# Patient Record
Sex: MALE | Race: WHITE | NOT HISPANIC OR LATINO | Employment: OTHER | URBAN - METROPOLITAN AREA
[De-identification: names, ages, dates, MRNs, and addresses within clinical notes are randomized per-mention and may not be internally consistent; named-entity substitution may affect disease eponyms.]

---

## 2017-01-04 ENCOUNTER — OFFICE VISIT (OUTPATIENT)
Dept: CARDIOLOGY | Facility: CLINIC | Age: 77
End: 2017-01-04

## 2017-01-04 VITALS
HEART RATE: 60 BPM | BODY MASS INDEX: 35.1 KG/M2 | WEIGHT: 231.6 LBS | SYSTOLIC BLOOD PRESSURE: 140 MMHG | DIASTOLIC BLOOD PRESSURE: 70 MMHG | HEIGHT: 68 IN

## 2017-01-04 DIAGNOSIS — I20.9 ANGINA PECTORIS (HCC): Primary | ICD-10-CM

## 2017-01-04 DIAGNOSIS — I25.709 CORONARY ARTERY DISEASE INVOLVING CORONARY BYPASS GRAFT OF NATIVE HEART WITH ANGINA PECTORIS (HCC): ICD-10-CM

## 2017-01-04 DIAGNOSIS — E78.5 DYSLIPIDEMIA: ICD-10-CM

## 2017-01-04 DIAGNOSIS — I10 ESSENTIAL HYPERTENSION: ICD-10-CM

## 2017-01-04 PROCEDURE — 93000 ELECTROCARDIOGRAM COMPLETE: CPT | Performed by: INTERNAL MEDICINE

## 2017-01-04 PROCEDURE — 99214 OFFICE O/P EST MOD 30 MIN: CPT | Performed by: INTERNAL MEDICINE

## 2017-01-04 RX ORDER — ISOSORBIDE DINITRATE 30 MG/1
30 TABLET ORAL DAILY
Qty: 90 TABLET | Refills: 3 | Status: SHIPPED | OUTPATIENT
Start: 2017-01-04 | End: 2017-01-25

## 2017-01-04 RX ORDER — RANOLAZINE 500 MG/1
500 TABLET, EXTENDED RELEASE ORAL 2 TIMES DAILY
Qty: 180 TABLET | Refills: 3 | Status: SHIPPED | OUTPATIENT
Start: 2017-01-04 | End: 2017-02-27 | Stop reason: SDUPTHER

## 2017-01-04 NOTE — LETTER
January 4, 2017     Antonio Hatch MD  [address]  [address]  [place] [address]    Patient: Zach Ramsey   YOB: 1940   Date of Visit: 1/4/2017       Dear Dr. Holden MD:    Thank you for referring Zach Ramsey to me for evaluation. Below are the relevant portions of my assessment and plan of care.    If you have questions, please do not hesitate to call me. I look forward to following Zach along with you.         Sincerely,        Lucas Wallis MD        CC: No Recipients  Lucas Wallis MD  1/4/2017  1:59 PM  Signed  Subjective:     Encounter Date:01/04/2017      Patient ID: Zach Ramsey is a 76 y.o. male.    Chief Complaint: Follow up of chest pain shortness of breath and coronary artery disease    History of Present Illness  Patient returns today for annual follow-up of coronary artery disease.  Her last visit, he notes that he is doing slowly and progressively worse over the last 3-6 months.  He notes exertional loss of breath associated with bilateral arm and shoulder pain that occurs with activity and only with activity.  This is much worse after eating.  This is increasing in frequency and severity over the last several months.  Does not occur at rest.  No palpitations orthopnea PND peripheral edema.  He also notes some nocturnal chest pain but only after eating late at night it is different from the above.        1. Coronary artery disease.  a. In 2000, coronary artery bypass grafting.   b. In 2010, cardiac catheterization done at [facility] which showed normal left ventricular function. Three vessel native coronary disease. Patent vein graft to the PDA, patent vein graft to the diagonal and OM branches, and a patent LIMA to the LAD.   c. Echocardiogram, June 2014 which showed mild  to moderate aortic regurgitation. Normal LVEF.   d. Cardiac catheterization, 09/04/2015, revealing patent URIOSTEGUI to LAD, patent saphenous vein graft to the D1, OM1 and OM2, patent  saphenous vein graft to the PDA and native right coronary artery with multiple  stenoses and heavily calcified areas that supply relative little left ventricular myocardium, medical management.     2.  Hypertension.   3. Dyslipidemia.   4. Gastroesophageal reflux disease.   5. Arthritis.   6. Nephrolithiasis.   7. Remote neck surgery.   8. Right total knee replacement.   9. Tonsillectomy.   10. Remote tobacco use with cessation in 1977.         No Known Allergies      Current Outpatient Prescriptions:   •  aspirin 81 MG EC tablet, Take  by mouth daily., Disp: , Rfl:   •  clopidogrel (PLAVIX) 75 MG tablet, Take  by mouth daily., Disp: , Rfl:   •  esomeprazole (NEXIUM) 40 MG capsule, Take  by mouth., Disp: , Rfl:   •  lisinopril-hydrochlorothiazide (PRINZIDE,ZESTORETIC) 20-12.5 MG per tablet, Take  by mouth daily., Disp: , Rfl:   •  nitroglycerin (NITROSTAT) 0.4 MG SL tablet, Place  under the tongue., Disp: , Rfl:   •  pravastatin (PRAVACHOL) 80 MG tablet, Take  by mouth daily., Disp: , Rfl:   •  isosorbide dinitrate (ISORDIL) 30 MG tablet, Take 1 tablet by mouth Daily., Disp: 90 tablet, Rfl: 3  •  ranolazine (RANEXA) 500 MG 12 hr tablet, Take 1 tablet by mouth 2 (Two) Times a Day., Disp: 180 tablet, Rfl: 3    The following portions of the patient's history were reviewed and updated as appropriate: allergies, current medications, past family history, past medical history, past social history, past surgical history and problem list.    Review of Systems   Constitution: Negative.   Cardiovascular: Positive for chest pain and dyspnea on exertion.   Respiratory: Negative.    Hematologic/Lymphatic: Negative for bleeding problem. Does not bruise/bleed easily.   Skin: Negative for rash.   Musculoskeletal: Positive for arthritis. Negative for muscle weakness and myalgias.   Gastrointestinal: Positive for heartburn. Negative for nausea and vomiting.   Neurological: Negative.           Objective:   Blood pressure 140/70, pulse  "60, height 68\" (172.7 cm), weight 231 lb 9.6 oz (105 kg).      Physical Exam   Constitutional: He is oriented to person, place, and time. He appears well-developed and well-nourished.   HENT:   Head: Normocephalic.   Mouth/Throat: Oropharynx is clear and moist.   Eyes: Pupils are equal, round, and reactive to light.   Neck: Normal carotid pulses and no JVD present. Carotid bruit is not present.   Cardiovascular: Normal rate, regular rhythm, S1 normal and S2 normal.  Exam reveals no gallop and no friction rub.    Murmur (3/6 systolic ejection murmur heard best at the apex and right upper sternal border) heard.  Pulses:       Carotid pulses are 2+ on the right side, and 2+ on the left side.       Dorsalis pedis pulses are 2+ on the right side, and 2+ on the left side.        Posterior tibial pulses are 2+ on the right side, and 2+ on the left side.   Pulmonary/Chest: No respiratory distress. He has no wheezes. He has no rales.   Abdominal: He exhibits no distension and no abdominal bruit. There is no hepatosplenomegaly. There is no tenderness. There is no rebound.   Musculoskeletal: He exhibits no edema, tenderness or deformity.   Neurological: He is alert and oriented to person, place, and time. He has normal strength.   Skin: Skin is warm and dry. No rash noted. No cyanosis. Nails show no clubbing.   Psychiatric: He has a normal mood and affect. Cognition and memory are normal.       Lab Review:      ECG 12 Lead  Date/Time: 1/4/2017 1:55 PM  Performed by: OSCAR SIMON  Authorized by: OSCAR SIMON   Rhythm: sinus rhythm  Comments: Inferior T wave abnormality                Assessment:   Zach was seen today for hypertension, shortness of breath, fatigue, coronary artery disease and hyperlipidemia.    Diagnoses and all orders for this visit:    Angina pectoris    Coronary artery disease involving coronary bypass graft of native heart with angina pectoris    Dyslipidemia    Essential hypertension    Other " orders  -     isosorbide dinitrate (ISORDIL) 30 MG tablet; Take 1 tablet by mouth Daily.  -     ranolazine (RANEXA) 500 MG 12 hr tablet; Take 1 tablet by mouth 2 (Two) Times a Day.  -     ECG 12 Lead      Impression  1. Functional class III angina.  Slowly progressing over the last 3-6 months.  Known severe native RCA disease, supplying a small amount of myocardium in the right PDA by catheter 2 years ago.  This is felt to be high risk intervention.  Otherwise grafts are widely patent at that time.  2. Hypertension controlled  3. Dyslipidemia controlled on current statin dose  4. GERD, nocturnal.  Despite PPI.  5. Sick sinus syndrome/chronotropic incompetence.    Plan:  1. Long discussion regarding options with the patient and spouse.  Ms. somewhat reluctant to undergo repeat heart catheterization.  It is certainly possible that we are simply dealing with the known prior area of ischemia that is progressing.  He has been intolerant to beta blockers in the past.  Since this is not an option.  2. Continue current medications.  Add isosorbide 30 mg daily, and Ranexa 500 mg twice a day  3. Revisit in 3 weeks to assess for efficacy of the above medical therapy.  If he fails the above medical therapy, wore his symptoms worsen in the meantime, the patient will need ischemic evaluation, likely left heart catheterization.     Lucas Wallis MD

## 2017-01-04 NOTE — PROGRESS NOTES
Subjective:     Encounter Date:01/04/2017      Patient ID: Zach Ramsey is a 76 y.o. male.    Chief Complaint: Follow up of chest pain shortness of breath and coronary artery disease    History of Present Illness  Patient returns today for annual follow-up of coronary artery disease.  Her last visit, he notes that he is doing slowly and progressively worse over the last 3-6 months.  He notes exertional loss of breath associated with bilateral arm and shoulder pain that occurs with activity and only with activity.  This is much worse after eating.  This is increasing in frequency and severity over the last several months.  Does not occur at rest.  No palpitations orthopnea PND peripheral edema.  He also notes some nocturnal chest pain but only after eating late at night it is different from the above.        1. Coronary artery disease.  a. In 2000, coronary artery bypass grafting.   b. In 2010, cardiac catheterization done at [facility] which showed normal left ventricular function. Three vessel native coronary disease. Patent vein graft to the PDA, patent vein graft to the diagonal and OM branches, and a patent LIMA to the LAD.   c. Echocardiogram, June 2014 which showed mild  to moderate aortic regurgitation. Normal LVEF.   d. Cardiac catheterization, 09/04/2015, revealing patent URIOSTEGUI to LAD, patent saphenous vein graft to the D1, OM1 and OM2, patent saphenous vein graft to the PDA and native right coronary artery with multiple  stenoses and heavily calcified areas that supply relative little left ventricular myocardium, medical management.     2.  Hypertension.   3. Dyslipidemia.   4. Gastroesophageal reflux disease.   5. Arthritis.   6. Nephrolithiasis.   7. Remote neck surgery.   8. Right total knee replacement.   9. Tonsillectomy.   10. Remote tobacco use with cessation in 1977.         No Known Allergies      Current Outpatient Prescriptions:   •  aspirin 81 MG EC tablet, Take  by mouth  "daily., Disp: , Rfl:   •  clopidogrel (PLAVIX) 75 MG tablet, Take  by mouth daily., Disp: , Rfl:   •  esomeprazole (NEXIUM) 40 MG capsule, Take  by mouth., Disp: , Rfl:   •  lisinopril-hydrochlorothiazide (PRINZIDE,ZESTORETIC) 20-12.5 MG per tablet, Take  by mouth daily., Disp: , Rfl:   •  nitroglycerin (NITROSTAT) 0.4 MG SL tablet, Place  under the tongue., Disp: , Rfl:   •  pravastatin (PRAVACHOL) 80 MG tablet, Take  by mouth daily., Disp: , Rfl:   •  isosorbide dinitrate (ISORDIL) 30 MG tablet, Take 1 tablet by mouth Daily., Disp: 90 tablet, Rfl: 3  •  ranolazine (RANEXA) 500 MG 12 hr tablet, Take 1 tablet by mouth 2 (Two) Times a Day., Disp: 180 tablet, Rfl: 3    The following portions of the patient's history were reviewed and updated as appropriate: allergies, current medications, past family history, past medical history, past social history, past surgical history and problem list.    Review of Systems   Constitution: Negative.   Cardiovascular: Positive for chest pain and dyspnea on exertion.   Respiratory: Negative.    Hematologic/Lymphatic: Negative for bleeding problem. Does not bruise/bleed easily.   Skin: Negative for rash.   Musculoskeletal: Positive for arthritis. Negative for muscle weakness and myalgias.   Gastrointestinal: Positive for heartburn. Negative for nausea and vomiting.   Neurological: Negative.           Objective:   Blood pressure 140/70, pulse 60, height 68\" (172.7 cm), weight 231 lb 9.6 oz (105 kg).      Physical Exam   Constitutional: He is oriented to person, place, and time. He appears well-developed and well-nourished.   HENT:   Head: Normocephalic.   Mouth/Throat: Oropharynx is clear and moist.   Eyes: Pupils are equal, round, and reactive to light.   Neck: Normal carotid pulses and no JVD present. Carotid bruit is not present.   Cardiovascular: Normal rate, regular rhythm, S1 normal and S2 normal.  Exam reveals no gallop and no friction rub.    Murmur (3/6 systolic ejection " murmur heard best at the apex and right upper sternal border) heard.  Pulses:       Carotid pulses are 2+ on the right side, and 2+ on the left side.       Dorsalis pedis pulses are 2+ on the right side, and 2+ on the left side.        Posterior tibial pulses are 2+ on the right side, and 2+ on the left side.   Pulmonary/Chest: No respiratory distress. He has no wheezes. He has no rales.   Abdominal: He exhibits no distension and no abdominal bruit. There is no hepatosplenomegaly. There is no tenderness. There is no rebound.   Musculoskeletal: He exhibits no edema, tenderness or deformity.   Neurological: He is alert and oriented to person, place, and time. He has normal strength.   Skin: Skin is warm and dry. No rash noted. No cyanosis. Nails show no clubbing.   Psychiatric: He has a normal mood and affect. Cognition and memory are normal.       Lab Review:      ECG 12 Lead  Date/Time: 1/4/2017 1:55 PM  Performed by: OSCAR SIMON  Authorized by: OSCAR SIMON   Rhythm: sinus rhythm  Comments: Inferior T wave abnormality                Assessment:   Zach was seen today for hypertension, shortness of breath, fatigue, coronary artery disease and hyperlipidemia.    Diagnoses and all orders for this visit:    Angina pectoris    Coronary artery disease involving coronary bypass graft of native heart with angina pectoris    Dyslipidemia    Essential hypertension    Other orders  -     isosorbide dinitrate (ISORDIL) 30 MG tablet; Take 1 tablet by mouth Daily.  -     ranolazine (RANEXA) 500 MG 12 hr tablet; Take 1 tablet by mouth 2 (Two) Times a Day.  -     ECG 12 Lead      Impression  1. Functional class III angina.  Slowly progressing over the last 3-6 months.  Known severe native RCA disease, supplying a small amount of myocardium in the right PDA by catheter 2 years ago.  This is felt to be high risk intervention.  Otherwise grafts are widely patent at that time.  2. Hypertension controlled  3. Dyslipidemia  controlled on current statin dose  4. GERD, nocturnal.  Despite PPI.  5. Sick sinus syndrome/chronotropic incompetence.    Plan:  1. Long discussion regarding options with the patient and spouse.  Ms. somewhat reluctant to undergo repeat heart catheterization.  It is certainly possible that we are simply dealing with the known prior area of ischemia that is progressing.  He has been intolerant to beta blockers in the past.  Since this is not an option.  2. Continue current medications.  Add isosorbide 30 mg daily, and Ranexa 500 mg twice a day  3. Revisit in 3 weeks to assess for efficacy of the above medical therapy.  If he fails the above medical therapy, wore his symptoms worsen in the meantime, the patient will need ischemic evaluation, likely left heart catheterization.     Lucas Wallis MD

## 2017-01-06 ENCOUNTER — OFFICE VISIT (OUTPATIENT)
Dept: FAMILY MEDICINE CLINIC | Facility: CLINIC | Age: 77
End: 2017-01-06

## 2017-01-06 VITALS
BODY MASS INDEX: 34.86 KG/M2 | TEMPERATURE: 97.6 F | RESPIRATION RATE: 18 BRPM | DIASTOLIC BLOOD PRESSURE: 64 MMHG | HEIGHT: 68 IN | OXYGEN SATURATION: 98 % | WEIGHT: 230 LBS | SYSTOLIC BLOOD PRESSURE: 132 MMHG | HEART RATE: 75 BPM

## 2017-01-06 DIAGNOSIS — J06.9 PROTRACTED URI: Primary | ICD-10-CM

## 2017-01-06 PROCEDURE — 99213 OFFICE O/P EST LOW 20 MIN: CPT | Performed by: FAMILY MEDICINE

## 2017-01-06 RX ORDER — AZITHROMYCIN 250 MG/1
TABLET, FILM COATED ORAL
Qty: 6 TABLET | Refills: 0 | Status: SHIPPED | OUTPATIENT
Start: 2017-01-06 | End: 2017-01-25

## 2017-01-06 NOTE — MR AVS SNAPSHOT
Zach Ramsey   1/6/2017 2:30 PM   Office Visit    Dept Phone:  [phone]   Encounter #:  [id number]    Provider:  Antonio Hatch MD   Department:  [facility]                Your Full Care Plan              Today's Medication Changes          These changes are accurate as of: 1/6/17  2:51 PM.  If you have any questions, ask your nurse or doctor.               New Medication(s)Ordered:     azithromycin 250 MG tablet   Commonly known as:  ZITHROMAX   Take 2 tablets the first day, then 1 tablet daily for 4 days.   Started by:  Antonio Hatch MD       HYDROcodone-homatropine 5-1.5 MG/5ML syrup   Commonly known as:  HYCODAN   Take 5 mL by mouth Every 6 (Six) Hours As Needed for cough.   Started by:  Antonio Hatch MD            Where to Get Your Medications      You can get these medications from any pharmacy     Bring a paper prescription for each of these medications     azithromycin 250 MG tablet    HYDROcodone-homatropine 5-1.5 MG/5ML syrup                  Your Updated Medication List          This list is accurate as of: 1/6/17  2:51 PM.  Always use your most recent med list.                aspirin 81 MG EC tablet       azithromycin 250 MG tablet   Commonly known as:  ZITHROMAX   Take 2 tablets the first day, then 1 tablet daily for 4 days.       clopidogrel 75 MG tablet   Commonly known as:  PLAVIX       HYDROcodone-homatropine 5-1.5 MG/5ML syrup   Commonly known as:  HYCODAN   Take 5 mL by mouth Every 6 (Six) Hours As Needed for cough.       isosorbide dinitrate 30 MG tablet   Commonly known as:  ISORDIL   Take 1 tablet by mouth Daily.       lisinopril-hydrochlorothiazide 20-12.5 MG per tablet   Commonly known as:  PRINZIDE,ZESTORETIC       NEXIUM 40 MG capsule   Generic drug:  esomeprazole       nitroglycerin 0.4 MG SL tablet   Commonly known as:  NITROSTAT       pravastatin 80 MG tablet   Commonly known as:  PRAVACHOL       ranolazine 500 MG 12  "hr tablet   Commonly known as:  RANEXA   Take 1 tablet by mouth 2 (Two) Times a Day.               You Were Diagnosed With        Codes Comments    Protracted URI    -  Primary ICD-10-CM: J06.9  ICD-9-CM: 465.9       Instructions     None    Patient Instructions History      Upcoming Appointments     Visit Type Date Time Department    OFFICE VISIT 1/6/2017  2:30 PM [facility]      [facility] Signup     Our records indicate that you have declined [facility] signup. If you would like to sign up for [facility] please email [email or web address] or call [phone] to obtain an activation code.             Other Info from Your Visit           Allergies     No Known Allergies      Reason for Visit     URI achy muscles, nasal congestion white in color, has been going on for 2 wks    Sore Throat     Cough           Vital Signs     Blood Pressure Pulse Temperature Respirations Height Weight    132/64 75 97.6 °F (36.4 °C) (Temporal Artery ) 18 68\" (172.7 cm) 230 lb (104 kg)    Oxygen Saturation Body Mass Index Smoking Status             98% 34.97 kg/m2 Former Smoker         Problems and Diagnoses Noted     Acute upper respiratory infection    -  Primary        "

## 2017-01-06 NOTE — PROGRESS NOTES
Chief Complaint   Patient presents with   • URI     achy muscles, nasal congestion white in color, has been going on for 2 wks   • Sore Throat   • Cough       Subjective     URI    This is a new problem. The current episode started 1 to 4 weeks ago (x 2 weeks ). The problem has been gradually worsening. There has been no fever. Associated symptoms include congestion (white), coughing (decreased sleep), headaches, rhinorrhea, sneezing, a sore throat and wheezing (decreased sleep). Pertinent negatives include no nausea or vomiting. Associated symptoms comments: aches all over. He has tried NSAIDs for the symptoms. The treatment provided mild relief.   Sore Throat    Associated symptoms include congestion (white), coughing (decreased sleep) and headaches. Pertinent negatives include no vomiting.   Cough   Associated symptoms include headaches, myalgias, rhinorrhea, a sore throat and wheezing (decreased sleep). Pertinent negatives include no fever.     Past Medical History,Medications, Allergies, and social history was reviewed.    Review of Systems   Constitutional: Positive for fatigue. Negative for fever.   HENT: Positive for congestion (white), rhinorrhea, sneezing and sore throat.    Respiratory: Positive for cough (decreased sleep) and wheezing (decreased sleep).    Gastrointestinal: Negative.  Negative for nausea and vomiting.   Musculoskeletal: Positive for myalgias.   Neurological: Positive for headaches.   Psychiatric/Behavioral: Positive for sleep disturbance.       Objective     Physical Exam   Constitutional: He is oriented to person, place, and time. Vital signs are normal. He appears well-developed and well-nourished.   HENT:   Head: Normocephalic and atraumatic.   Right Ear: Hearing, external ear and ear canal normal. Tympanic membrane is retracted. Tympanic membrane is not erythematous.   Left Ear: Hearing, external ear and ear canal normal. Tympanic membrane is retracted. Tympanic membrane is not  erythematous.   Nose: Nose normal.   Mouth/Throat: Uvula is midline. Posterior oropharyngeal erythema present. No oropharyngeal exudate.   Eyes: Conjunctivae, EOM and lids are normal. Pupils are equal, round, and reactive to light.   Neck: Normal range of motion. Neck supple. No thyromegaly present.   Cardiovascular: Normal rate, regular rhythm and normal heart sounds.  Exam reveals no gallop and no friction rub.    No murmur heard.  Pulmonary/Chest: Effort normal. No respiratory distress. He has no decreased breath sounds. He has wheezes (faint exp with cough). He has no rhonchi. He has no rales.   Abdominal: Normal appearance.   Neurological: He is alert and oriented to person, place, and time. He has normal strength.   Skin: Skin is warm and dry.   Psychiatric: He has a normal mood and affect. His speech is normal and behavior is normal. Cognition and memory are normal.   Nursing note and vitals reviewed.        Assessment/Plan     Problem List Items Addressed This Visit     None      Visit Diagnoses     Protracted URI    -  Primary    Relevant Medications    azithromycin (ZITHROMAX) 250 MG tablet    HYDROcodone-homatropine (HYCODAN) 5-1.5 MG/5ML syrup          Hao dated on 01/06/17   was reviewed and appropriate.      DISCUSSION  Start Z edith, increase fluids, Hycodan as needed. Side effects explained.   Call if not improving.      Return if symptoms worsen or fail to improve.     MEDICATIONS PRESCRIBED  Requested Prescriptions     Signed Prescriptions Disp Refills   • azithromycin (ZITHROMAX) 250 MG tablet 6 tablet 0     Sig: Take 2 tablets the first day, then 1 tablet daily for 4 days.   • HYDROcodone-homatropine (HYCODAN) 5-1.5 MG/5ML syrup 240 mL 0     Sig: Take 5 mL by mouth Every 6 (Six) Hours As Needed for cough.          Antonio Hatch MD

## 2017-01-10 ENCOUNTER — TELEPHONE (OUTPATIENT)
Dept: CARDIOLOGY | Facility: CLINIC | Age: 77
End: 2017-01-10

## 2017-01-10 NOTE — TELEPHONE ENCOUNTER
Pt called and stated he would not be taking the Isosorbide due to dizziness and low BP after taking. Pt is going to continue to take the Ranexa 500mg BID. He states he will call back if there are any other questions or concerns. Just an FYI.

## 2017-01-25 ENCOUNTER — OFFICE VISIT (OUTPATIENT)
Dept: CARDIOLOGY | Facility: CLINIC | Age: 77
End: 2017-01-25

## 2017-01-25 VITALS
BODY MASS INDEX: 34.75 KG/M2 | HEIGHT: 68 IN | SYSTOLIC BLOOD PRESSURE: 132 MMHG | HEART RATE: 72 BPM | WEIGHT: 229.3 LBS | DIASTOLIC BLOOD PRESSURE: 60 MMHG

## 2017-01-25 DIAGNOSIS — R07.2 PRECORDIAL PAIN: ICD-10-CM

## 2017-01-25 DIAGNOSIS — I25.709 CORONARY ARTERY DISEASE INVOLVING CORONARY BYPASS GRAFT OF NATIVE HEART WITH ANGINA PECTORIS (HCC): ICD-10-CM

## 2017-01-25 DIAGNOSIS — I35.1 AORTIC VALVE INSUFFICIENCY, UNSPECIFIED ETIOLOGY: Primary | ICD-10-CM

## 2017-01-25 DIAGNOSIS — E78.5 DYSLIPIDEMIA: ICD-10-CM

## 2017-01-25 DIAGNOSIS — I10 ESSENTIAL HYPERTENSION: ICD-10-CM

## 2017-01-25 DIAGNOSIS — K21.00 GASTROESOPHAGEAL REFLUX DISEASE WITH ESOPHAGITIS: ICD-10-CM

## 2017-01-25 PROCEDURE — 99214 OFFICE O/P EST MOD 30 MIN: CPT | Performed by: INTERNAL MEDICINE

## 2017-01-25 NOTE — PROGRESS NOTES
"Subjective:     Encounter Date:01/25/2017      Patient ID: Zach Ramsey is a 76 y.o. male.    Chief Complaint: Follow up of chest pain and coronary artery disease    1. Coronary artery disease.  a. In 2000, coronary artery bypass grafting.   b. In 2010, cardiac catheterization done at [facility] which showed normal left ventricular function. Three vessel native coronary disease. Patent vein graft to the PDA, patent vein graft to the diagonal and OM branches, and a patent LIMA to the LAD.   c. Echocardiogram, June 2014 which showed mild  to moderate aortic regurgitation. Normal LVEF.   d. Cardiac catheterization, 09/04/2015, revealing patent URIOSTEGUI to LAD, patent saphenous vein graft to the D1, OM1 and OM2, patent saphenous vein graft to the PDA and native right coronary artery with multiple  stenoses and heavily calcified areas that supply relative little left ventricular myocardium, medical management.     2.  Hypertension.   3. Dyslipidemia.   4. Gastroesophageal reflux disease.   5. Arthritis.   6. Nephrolithiasis.   7. Remote neck surgery.   8. Right total knee replacement.   9. Tonsillectomy.   10. Remote tobacco use with cessation in 1977.         History of Present Illness  Patient returns today for follow up with a history of chest pain and coronary artery disease.  Her last visit patient was put on Ranexa and isosorbide.  He has a d discontinue the isosorbide due to symptomatic hypotension.  The Ranexa he continued.  Overall feels less chest pain with exertion.  He and his wife also note he has a significant amount of dietary changes so he no longer eats as much nor is rapidly with meals.  He notes that this appears to have had the bigger improvement in his symptoms than medical therapy.  He also notes some dysphagia since he has a \"pain attention to how he swallows\".  Otherwise we will be regurgitated later.  He has predominantly discomfort in his chest and arm when he exercises after " "eating.  He has much less chest discomfort if he exercises before eating.  He has chest discomfort simply after eating.  (Without exercise).     Allergies   Allergen Reactions   • Isosorbide Dizziness   • Metoprolol Nausea Only         Current Outpatient Prescriptions:   •  aspirin 81 MG EC tablet, Take  by mouth daily., Disp: , Rfl:   •  clopidogrel (PLAVIX) 75 MG tablet, Take  by mouth daily., Disp: , Rfl:   •  esomeprazole (NEXIUM) 40 MG capsule, Take  by mouth., Disp: , Rfl:   •  lisinopril-hydrochlorothiazide (PRINZIDE,ZESTORETIC) 20-12.5 MG per tablet, Take  by mouth daily., Disp: , Rfl:   •  nitroglycerin (NITROSTAT) 0.4 MG SL tablet, Place  under the tongue., Disp: , Rfl:   •  pravastatin (PRAVACHOL) 80 MG tablet, Take  by mouth daily., Disp: , Rfl:   •  ranolazine (RANEXA) 500 MG 12 hr tablet, Take 1 tablet by mouth 2 (Two) Times a Day., Disp: 180 tablet, Rfl: 3    The following portions of the patient's history were reviewed and updated as appropriate: allergies, current medications, past family history, past medical history, past social history, past surgical history and problem list.    Review of Systems   Constitution: Negative.   Cardiovascular: Positive for chest pain.   Respiratory: Negative.    Hematologic/Lymphatic: Negative for bleeding problem. Does not bruise/bleed easily.   Skin: Negative for rash.   Musculoskeletal: Negative for muscle weakness and myalgias.   Gastrointestinal: Positive for dysphagia. Negative for heartburn, nausea and vomiting.   Neurological: Negative.           Objective:   Blood pressure 132/60, pulse 72, height 68\" (172.7 cm), weight 229 lb 4.8 oz (104 kg).      Physical Exam   Constitutional: He is oriented to person, place, and time. He appears well-developed and well-nourished.   HENT:   Mouth/Throat: Oropharynx is clear and moist.   Neck: No JVD present. Carotid bruit is not present. No thyromegaly present.   Cardiovascular: Regular rhythm, S1 normal, S2 normal and " intact distal pulses.  Exam reveals no gallop, no S3 and no S4.    Murmur heard.   Midsystolic murmur is present with a grade of 3/6  at the upper left sternal border, apex  Pulses:       Carotid pulses are 2+ on the right side, and 2+ on the left side.       Radial pulses are 2+ on the right side, and 2+ on the left side.   Pulmonary/Chest: Breath sounds normal.   Abdominal: Soft. Bowel sounds are normal. He exhibits no mass. There is no tenderness.   Musculoskeletal: He exhibits no edema.   Neurological: He is alert and oriented to person, place, and time.   Skin: Skin is warm and dry. No rash noted.       Lab Review:    Procedures        Assessment:   Zahc was seen today for follow-up, coronary artery disease and hypertension.    Diagnoses and all orders for this visit:    Aortic valve insufficiency, unspecified etiology    Coronary artery disease involving coronary bypass graft of native heart with angina pectoris    Dyslipidemia    Essential hypertension    Precordial pain    Gastroesophageal reflux disease with esophagitis      Impression  1. Coronary artery disease status post 50 months ago with chronic disease RCA.  2. Mild to moderate aortic insufficiency asymptomatic  3. Dyslipidemia  4. Hypertension controlled  5. Chest pain, some features of angina and also some of esophageal disease.  6. No GERD and esophagitis, has dysphagia for stricture or other GI motility disorder.    Plan:  1. Discussed options with patient.  His symptoms have improved significantly, primarily due to dietary changes.  At this time I agree with seeing Dr. Paula for possible EGD as soon as possible.  2. Meanwhile we'll continue current medical therapy of CAD/angina.  He understands that this exertional component worsens to contact us for possible Repeat catheter.  Otherwise we'll simply continue medical therapy.  3. Revisit in 6 MO, or sooner as needed.      I, Lucas Wallis MD, personally performed the services described in this  documentation as scribed by the above individual in my presence, and it is both accurate and complete

## 2017-01-25 NOTE — LETTER
January 25, 2017     Antonio Hatch MD  [address]  [facility] [place] [address]    Patient: Zach Ramsey   YOB: 1940   Date of Visit: 1/25/2017       Dear Dr. Holedn MD:    Thank you for referring Zach Ramsey to me for evaluation. Below are the relevant portions of my assessment and plan of care.    If you have questions, please do not hesitate to call me. I look forward to following Zach along with you.         Sincerely,        Lucas Wallis MD        CC: No Recipients  Lucas Wallis MD  1/25/2017  2:00 PM  Signed  Subjective:     Encounter Date:01/25/2017      Patient ID: Zach Ramsey is a 76 y.o. male.    Chief Complaint: Follow up of chest pain and coronary artery disease    1. Coronary artery disease.  a. In 2000, coronary artery bypass grafting.   b. In 2010, cardiac catheterization done at [facility] which showed normal left ventricular function. Three vessel native coronary disease. Patent vein graft to the PDA, patent vein graft to the diagonal and OM branches, and a patent LIMA to the LAD.   c. Echocardiogram, June 2014 which showed mild  to moderate aortic regurgitation. Normal LVEF.   d. Cardiac catheterization, 09/04/2015, revealing patent URIOSTEGUI to LAD, patent saphenous vein graft to the D1, OM1 and OM2, patent saphenous vein graft to the PDA and native right coronary artery with multiple  stenoses and heavily calcified areas that supply relative little left ventricular myocardium, medical management.     2.  Hypertension.   3. Dyslipidemia.   4. Gastroesophageal reflux disease.   5. Arthritis.   6. Nephrolithiasis.   7. Remote neck surgery.   8. Right total knee replacement.   9. Tonsillectomy.   10. Remote tobacco use with cessation in 1977.         History of Present Illness  Patient returns today for follow up with a history of chest pain and coronary artery disease.  Her last visit patient was put on Ranexa and isosorbide.  He has a d discontinue  "the isosorbide due to symptomatic hypotension.  The Ranexa he continued.  Overall feels less chest pain with exertion.  He and his wife also note he has a significant amount of dietary changes so he no longer eats as much nor is rapidly with meals.  He notes that this appears to have had the bigger improvement in his symptoms than medical therapy.  He also notes some dysphagia since he has a \"pain attention to how he swallows\".  Otherwise we will be regurgitated later.  He has predominantly discomfort in his chest and arm when he exercises after eating.  He has much less chest discomfort if he exercises before eating.  He has chest discomfort simply after eating.  (Without exercise).     Allergies   Allergen Reactions   • Isosorbide Dizziness   • Metoprolol Nausea Only         Current Outpatient Prescriptions:   •  aspirin 81 MG EC tablet, Take  by mouth daily., Disp: , Rfl:   •  clopidogrel (PLAVIX) 75 MG tablet, Take  by mouth daily., Disp: , Rfl:   •  esomeprazole (NEXIUM) 40 MG capsule, Take  by mouth., Disp: , Rfl:   •  lisinopril-hydrochlorothiazide (PRINZIDE,ZESTORETIC) 20-12.5 MG per tablet, Take  by mouth daily., Disp: , Rfl:   •  nitroglycerin (NITROSTAT) 0.4 MG SL tablet, Place  under the tongue., Disp: , Rfl:   •  pravastatin (PRAVACHOL) 80 MG tablet, Take  by mouth daily., Disp: , Rfl:   •  ranolazine (RANEXA) 500 MG 12 hr tablet, Take 1 tablet by mouth 2 (Two) Times a Day., Disp: 180 tablet, Rfl: 3    The following portions of the patient's history were reviewed and updated as appropriate: allergies, current medications, past family history, past medical history, past social history, past surgical history and problem list.    Review of Systems   Constitution: Negative.   Cardiovascular: Positive for chest pain.   Respiratory: Negative.    Hematologic/Lymphatic: Negative for bleeding problem. Does not bruise/bleed easily.   Skin: Negative for rash.   Musculoskeletal: Negative for muscle weakness and " "myalgias.   Gastrointestinal: Positive for dysphagia. Negative for heartburn, nausea and vomiting.   Neurological: Negative.           Objective:   Blood pressure 132/60, pulse 72, height 68\" (172.7 cm), weight 229 lb 4.8 oz (104 kg).      Physical Exam   Constitutional: He is oriented to person, place, and time. He appears well-developed and well-nourished.   HENT:   Mouth/Throat: Oropharynx is clear and moist.   Neck: No JVD present. Carotid bruit is not present. No thyromegaly present.   Cardiovascular: Regular rhythm, S1 normal, S2 normal and intact distal pulses.  Exam reveals no gallop, no S3 and no S4.    Murmur heard.   Midsystolic murmur is present with a grade of 3/6  at the upper left sternal border, apex  Pulses:       Carotid pulses are 2+ on the right side, and 2+ on the left side.       Radial pulses are 2+ on the right side, and 2+ on the left side.   Pulmonary/Chest: Breath sounds normal.   Abdominal: Soft. Bowel sounds are normal. He exhibits no mass. There is no tenderness.   Musculoskeletal: He exhibits no edema.   Neurological: He is alert and oriented to person, place, and time.   Skin: Skin is warm and dry. No rash noted.       Lab Review:    Procedures        Assessment:   Zach was seen today for follow-up, coronary artery disease and hypertension.    Diagnoses and all orders for this visit:    Aortic valve insufficiency, unspecified etiology    Coronary artery disease involving coronary bypass graft of native heart with angina pectoris    Dyslipidemia    Essential hypertension    Precordial pain    Gastroesophageal reflux disease with esophagitis      Impression  1. Coronary artery disease status post 50 months ago with chronic disease RCA.  2. Mild to moderate aortic insufficiency asymptomatic  3. Dyslipidemia  4. Hypertension controlled  5. Chest pain, some features of angina and also some of esophageal disease.  6. No GERD and esophagitis, has dysphagia for stricture or other GI " motility disorder.    Plan:  1. Discussed options with patient.  His symptoms have improved significantly, primarily due to dietary changes.  At this time I agree with seeing Dr. Paula for possible EGD as soon as possible.  2. Meanwhile we'll continue current medical therapy of CAD/angina.  He understands that this exertional component worsens to contact us for possible Repeat catheter.  Otherwise we'll simply continue medical therapy.  3. Revisit in 6 MO, or sooner as needed.      I, Lucas Wallis MD, personally performed the services described in this documentation as scribed by the above individual in my presence, and it is both accurate and complete

## 2017-02-22 RX ORDER — LISINOPRIL AND HYDROCHLOROTHIAZIDE 20; 12.5 MG/1; MG/1
TABLET ORAL
Qty: 90 TABLET | Refills: 3 | Status: SHIPPED | OUTPATIENT
Start: 2017-02-22 | End: 2018-03-05 | Stop reason: SDUPTHER

## 2017-02-22 RX ORDER — CLOPIDOGREL BISULFATE 75 MG/1
TABLET ORAL
Qty: 90 TABLET | Refills: 3 | Status: SHIPPED | OUTPATIENT
Start: 2017-02-22 | End: 2018-03-05 | Stop reason: SDUPTHER

## 2017-02-27 RX ORDER — RANOLAZINE 500 MG/1
500 TABLET, EXTENDED RELEASE ORAL 2 TIMES DAILY
Qty: 180 TABLET | Refills: 3 | Status: SHIPPED | OUTPATIENT
Start: 2017-02-27 | End: 2018-02-06

## 2017-03-08 PROBLEM — J45.909 ASTHMA: Status: ACTIVE | Noted: 2017-03-08

## 2017-03-08 PROBLEM — R73.9 HYPERGLYCEMIA: Status: ACTIVE | Noted: 2017-03-08

## 2017-03-08 PROBLEM — I73.9 PERIPHERAL ARTERIAL DISEASE (HCC): Status: ACTIVE | Noted: 2017-03-08

## 2017-03-09 ENCOUNTER — OFFICE VISIT (OUTPATIENT)
Dept: FAMILY MEDICINE CLINIC | Facility: CLINIC | Age: 77
End: 2017-03-09

## 2017-03-09 VITALS
BODY MASS INDEX: 35.01 KG/M2 | HEIGHT: 68 IN | WEIGHT: 231 LBS | HEART RATE: 73 BPM | RESPIRATION RATE: 18 BRPM | SYSTOLIC BLOOD PRESSURE: 124 MMHG | OXYGEN SATURATION: 99 % | TEMPERATURE: 97.8 F | DIASTOLIC BLOOD PRESSURE: 58 MMHG

## 2017-03-09 DIAGNOSIS — M79.602 LEFT ARM PAIN: ICD-10-CM

## 2017-03-09 DIAGNOSIS — R06.02 SHORTNESS OF BREATH: ICD-10-CM

## 2017-03-09 DIAGNOSIS — E78.2 MIXED HYPERLIPIDEMIA: ICD-10-CM

## 2017-03-09 DIAGNOSIS — R20.0 LEFT ARM NUMBNESS: ICD-10-CM

## 2017-03-09 DIAGNOSIS — Z23 IMMUNIZATION DUE: ICD-10-CM

## 2017-03-09 DIAGNOSIS — R05.9 COUGH: ICD-10-CM

## 2017-03-09 DIAGNOSIS — R07.9 CHEST PAIN, UNSPECIFIED TYPE: Primary | ICD-10-CM

## 2017-03-09 LAB
ALBUMIN SERPL-MCNC: 4.2 G/DL (ref 3.2–4.8)
ALBUMIN/GLOB SERPL: 1.8 G/DL (ref 1.5–2.5)
ALP SERPL-CCNC: 66 U/L (ref 25–100)
ALT SERPL-CCNC: 17 U/L (ref 7–40)
AST SERPL-CCNC: 17 U/L (ref 0–33)
BASOPHILS # BLD AUTO: 0.03 10*3/MM3 (ref 0–0.2)
BASOPHILS NFR BLD AUTO: 0.5 % (ref 0–1)
BILIRUB SERPL-MCNC: 0.4 MG/DL (ref 0.3–1.2)
BUN SERPL-MCNC: 19 MG/DL (ref 9–23)
BUN/CREAT SERPL: 23.8 (ref 7–25)
CALCIUM SERPL-MCNC: 9.5 MG/DL (ref 8.7–10.4)
CHLORIDE SERPL-SCNC: 106 MMOL/L (ref 99–109)
CHOLEST SERPL-MCNC: 138 MG/DL (ref 0–200)
CHOLEST/HDLC SERPL: 2.09 {RATIO}
CO2 SERPL-SCNC: 32 MMOL/L (ref 20–31)
CREAT SERPL-MCNC: 0.8 MG/DL (ref 0.6–1.3)
EOSINOPHIL # BLD AUTO: 0.1 10*3/MM3 (ref 0.1–0.3)
EOSINOPHIL NFR BLD AUTO: 1.8 % (ref 0–3)
ERYTHROCYTE [DISTWIDTH] IN BLOOD BY AUTOMATED COUNT: 13.5 % (ref 11.3–14.5)
GLOBULIN SER CALC-MCNC: 2.4 GM/DL
GLUCOSE SERPL-MCNC: 99 MG/DL (ref 70–100)
HCT VFR BLD AUTO: 35.5 % (ref 38.9–50.9)
HDLC SERPL-MCNC: 66 MG/DL (ref 40–60)
HGB BLD-MCNC: 12.1 G/DL (ref 13.1–17.5)
IMM GRANULOCYTES # BLD: 0.02 10*3/MM3 (ref 0–0.03)
IMM GRANULOCYTES NFR BLD: 0.4 % (ref 0–0.6)
LDLC SERPL CALC-MCNC: 59 MG/DL (ref 0–100)
LYMPHOCYTES # BLD AUTO: 1.12 10*3/MM3 (ref 0.6–4.8)
LYMPHOCYTES NFR BLD AUTO: 20.5 % (ref 24–44)
MCH RBC QN AUTO: 31.9 PG (ref 27–31)
MCHC RBC AUTO-ENTMCNC: 34.1 G/DL (ref 32–36)
MCV RBC AUTO: 93.7 FL (ref 80–99)
MONOCYTES # BLD AUTO: 0.57 10*3/MM3 (ref 0–1)
MONOCYTES NFR BLD AUTO: 10.4 % (ref 0–12)
NEUTROPHILS # BLD AUTO: 3.63 10*3/MM3 (ref 1.5–8.3)
NEUTROPHILS NFR BLD AUTO: 66.4 % (ref 41–71)
PLATELET # BLD AUTO: 213 10*3/MM3 (ref 150–450)
POTASSIUM SERPL-SCNC: 4.5 MMOL/L (ref 3.5–5.5)
PROT SERPL-MCNC: 6.6 G/DL (ref 5.7–8.2)
RBC # BLD AUTO: 3.79 10*6/MM3 (ref 4.2–5.76)
SODIUM SERPL-SCNC: 141 MMOL/L (ref 132–146)
TRIGL SERPL-MCNC: 66 MG/DL (ref 0–150)
VLDLC SERPL CALC-MCNC: 13.2 MG/DL
WBC # BLD AUTO: 5.47 10*3/MM3 (ref 3.5–10.8)

## 2017-03-09 PROCEDURE — G0009 ADMIN PNEUMOCOCCAL VACCINE: HCPCS | Performed by: FAMILY MEDICINE

## 2017-03-09 PROCEDURE — 99214 OFFICE O/P EST MOD 30 MIN: CPT | Performed by: FAMILY MEDICINE

## 2017-03-09 PROCEDURE — 90670 PCV13 VACCINE IM: CPT | Performed by: FAMILY MEDICINE

## 2017-03-09 NOTE — PROGRESS NOTES
Chief Complaint   Patient presents with   • Chest Pain     and pain in arm and no pain at this moment and when he bends over it feels like something is pressing onhis chestand has trouble breathing and gets pain down arms and into fingers and arm gets weak. pt states this has been going on for 6-8 months and seen cardio in jan and done EKG and cardio thinks could be something but dont want to go in and not able todo angiogram close together and he had one done last yr. pt is asking if can get CT scan and see if something is noticable bc he can't keep feeling    • needs pneumonia shot       Subjective     Chest Pain    This is a recurrent problem. The problem has been unchanged. The pain is present in the substernal region (feels like air shuts off, better with sitting. Pain starts in the chest and then pain to arm). The pain is mild. The pain radiates to the left arm (left arm gets weak when trying to use it). Associated symptoms include a cough (annoying cough). Pertinent negatives include no dizziness, nausea, near-syncope or vomiting. Associated symptoms comments: during visit, no chest pain but feels tingling in the left hand,   when bends over, the pain starts.   Gets if walks a lot. . The cough has no precipitants. The cough is non-productive. He has tried nothing (has not tried NTG for this pain , Imdur was stopped due to BP Low) for the symptoms. The treatment provided no relief.   His past medical history is significant for CAD.   Pertinent negatives for past medical history include no CHF.   His family medical history is significant for CAD. Prior diagnostic workup includes cardiac catherization.     Left eye cruz    Had seen Dr Blair in the past for right arm issues and neck issues. Had C6 disc surg.     Had angio 2015. Discussed with Dr Paulino and concerned about EGD  and Dr Wallis does not want to do another angiogram    CAth 2015 report  1. Severe, native disease in the left anterior descending  coronary artery with  a patent left internal mammary artery graft to the left anterior descending  coronary artery.   2. Occluded left circumflex coronary artery with a patent saphenous vein graft  to the first diagonal branch, first obtuse marginal branch, and second obtuse  marginal branch.   3. Occluded distal left circumflex coronary artery with a patent saphenous  vein graft to the left, codominant posterior descending artery.   4. Ungrafted right coronary artery with severe, diffuse, calcific disease in  the proximal and mid-portions, as well as in the distal posterior descending  artery portion. This was the culprit area of ischemia on the stress Cardiolite.     5. Normal left ventricular function.   6. Systemic hypertension.      RECOMMENDATIONS:  1. The patient's area of ischemia corresponded to his ungrafted right coronary  artery. Unfortunately, there were multiple stenoses and heavily calcified that  supplied relatively little left ventricular myocardium.   2. Even though the patient had on his stress Cardiolite, I think this would be  best served with medical therapy, as it would be quite a difficult, high-risk  percutaneous coronary intervention.    Past Medical History,Medications, Allergies, and social history was reviewed.    Review of Systems   Constitutional: Negative.    HENT: Negative.    Eyes: Positive for discharge (at times, left eye).   Respiratory: Positive for cough (annoying cough).    Cardiovascular: Positive for chest pain. Negative for leg swelling and near-syncope.   Gastrointestinal: Negative.  Negative for nausea and vomiting.   Genitourinary: Negative.    Musculoskeletal: Positive for arthralgias.   Neurological: Negative.  Negative for dizziness.   Psychiatric/Behavioral: Negative.        Objective     Physical Exam   Constitutional: He is oriented to person, place, and time. Vital signs are normal. He appears well-developed and well-nourished.   HENT:   Head: Normocephalic and  atraumatic.   Right Ear: Hearing, tympanic membrane, external ear and ear canal normal.   Left Ear: Hearing, tympanic membrane, external ear and ear canal normal.   Mouth/Throat: Oropharynx is clear and moist.   Eyes: Conjunctivae, EOM and lids are normal. Pupils are equal, round, and reactive to light.   Neck: Normal range of motion. Neck supple. No thyromegaly present.   Cardiovascular: Normal rate, regular rhythm and normal heart sounds.  Exam reveals no friction rub.    No murmur heard.  Pulmonary/Chest: Effort normal and breath sounds normal. No respiratory distress. He has no wheezes. He has no rales. He exhibits tenderness.   Mild left anterior chest wall tenderness   Abdominal: Soft. Normal appearance and bowel sounds are normal. He exhibits no distension and no mass. There is no tenderness. There is no rebound and no guarding.   Musculoskeletal: He exhibits no edema.   Has some increased pain and numbness in the left hand when raising above head.   Neurological: He is alert and oriented to person, place, and time. He has normal strength.   Skin: Skin is warm and dry.   Psychiatric: He has a normal mood and affect. His speech is normal and behavior is normal. Cognition and memory are normal.   Nursing note and vitals reviewed.        Assessment/Plan     Problem List Items Addressed This Visit        Cardiovascular and Mediastinum    Hyperlipidemia    Relevant Orders    Lipid Panel With / Chol / HDL Ratio      Other Visit Diagnoses     Chest pain, unspecified type    -  Primary    Relevant Orders    CT Chest With Contrast    CBC & Differential    Comprehensive Metabolic Panel    Shortness of breath        Relevant Orders    CT Chest With Contrast    CBC & Differential    Comprehensive Metabolic Panel    Left arm pain        Relevant Orders    CT Chest With Contrast    Left arm numbness        Relevant Orders    CT Chest With Contrast    Cough        Relevant Orders    CT Chest With Contrast    Immunization  due        Relevant Medications    pneumococcal conj. 13-valent (PREVNAR-13) vaccine 0.5 mL (Completed)              DISCUSSION  Persistent chest pain.  Shortness of breath with cough, left arm pain and left arm numbness.  Concerned about chest problem.  It had chest x-ray in 2015 ordered in 2015 by Dr. Wallis but no follow-up and report had stated vague changes on the left side.  If CT normal, consider cervical disc disease as cause of left arm issues.  May still be related to the coronary disease and inoperable stenosis seen on cardiac catheterization.    Further plan once we have CT scan back.    Prevnar today.  Vaccine information statement given.    Hyperlipidemia.  Check CMP and lipid panel given coronary disease and medications.    MEDICATIONS PRESCRIBED  Requested Prescriptions      No prescriptions requested or ordered in this encounter          Antonio Hatch MD

## 2017-03-13 ENCOUNTER — HOSPITAL ENCOUNTER (OUTPATIENT)
Dept: CT IMAGING | Facility: HOSPITAL | Age: 77
Discharge: HOME | End: 2017-03-13
Admitting: FAMILY MEDICINE

## 2017-03-13 DIAGNOSIS — R06.02 SHORTNESS OF BREATH: ICD-10-CM

## 2017-03-13 DIAGNOSIS — M79.602 LEFT ARM PAIN: ICD-10-CM

## 2017-03-13 DIAGNOSIS — R05.9 COUGH: ICD-10-CM

## 2017-03-13 DIAGNOSIS — R07.9 CHEST PAIN, UNSPECIFIED TYPE: ICD-10-CM

## 2017-03-13 DIAGNOSIS — R20.0 LEFT ARM NUMBNESS: ICD-10-CM

## 2017-03-13 PROCEDURE — 71260 CT THORAX DX C+: CPT

## 2017-03-13 PROCEDURE — 0 IOPAMIDOL 61 % SOLUTION: Performed by: FAMILY MEDICINE

## 2017-03-13 RX ADMIN — IOPAMIDOL 90 ML: 612 INJECTION, SOLUTION INTRAVENOUS at 11:00

## 2017-03-16 DIAGNOSIS — R20.0 LEFT UPPER EXTREMITY NUMBNESS: Primary | ICD-10-CM

## 2017-03-30 ENCOUNTER — TELEPHONE (OUTPATIENT)
Dept: FAMILY MEDICINE CLINIC | Facility: CLINIC | Age: 77
End: 2017-03-30

## 2017-03-30 DIAGNOSIS — G56.12 LEFT MEDIAN NERVE NEUROPATHY: Primary | ICD-10-CM

## 2017-03-30 DIAGNOSIS — G56.22 ULNAR NEUROPATHY AT ELBOW, LEFT: ICD-10-CM

## 2017-03-30 RX ORDER — NITROGLYCERIN 0.4 MG/1
0.4 TABLET SUBLINGUAL
Qty: 25 TABLET | Refills: 6 | Status: SHIPPED | OUTPATIENT
Start: 2017-03-30 | End: 2018-08-08 | Stop reason: SDUPTHER

## 2017-03-30 NOTE — TELEPHONE ENCOUNTER
Please call.  The nerve test shows that he has got carpal tunnel syndrome on the left but also nerve pinched at the left elbow.  This is was probably causing his pain and discomfort.  Recommend refer to orthopedics for evaluation for left median neuropathy and left ulnar neuropathy.  Please set up if he is agreeable.

## 2017-03-31 NOTE — TELEPHONE ENCOUNTER
SPOKE WITH PT AND INFORMED HIM OF RESULTS AND PT VERBALIZED UNDERSTANDING AND STATES HE CAN DO ANY DAY EXCEPT Friday AS HE WORKS PART TIME.

## 2017-08-02 ENCOUNTER — OFFICE VISIT (OUTPATIENT)
Dept: CARDIOLOGY | Facility: CLINIC | Age: 77
End: 2017-08-02

## 2017-08-02 VITALS
SYSTOLIC BLOOD PRESSURE: 148 MMHG | DIASTOLIC BLOOD PRESSURE: 78 MMHG | HEART RATE: 61 BPM | BODY MASS INDEX: 34.56 KG/M2 | WEIGHT: 228 LBS | HEIGHT: 68 IN

## 2017-08-02 DIAGNOSIS — I10 ESSENTIAL HYPERTENSION: ICD-10-CM

## 2017-08-02 DIAGNOSIS — E78.2 MIXED HYPERLIPIDEMIA: ICD-10-CM

## 2017-08-02 DIAGNOSIS — R07.2 PRECORDIAL PAIN: ICD-10-CM

## 2017-08-02 DIAGNOSIS — I25.10 ATHEROSCLEROSIS OF NATIVE CORONARY ARTERY OF NATIVE HEART WITHOUT ANGINA PECTORIS: Primary | ICD-10-CM

## 2017-08-02 PROCEDURE — 99214 OFFICE O/P EST MOD 30 MIN: CPT | Performed by: INTERNAL MEDICINE

## 2017-08-02 PROCEDURE — 93010 ELECTROCARDIOGRAM REPORT: CPT | Performed by: INTERNAL MEDICINE

## 2017-08-02 NOTE — PROGRESS NOTES
Subjective:     Encounter Date:08/02/2017      Patient ID: Zach Ramsey is a 77 y.o. male.    Chief Complaint: Aortic valve insufficiency; Atherosclerosis of coronary artery; Hyperlipidemia; Hypertension; Chest Pain; and Dizziness      PROBLEM LIST:  1. Coronary artery disease.  a. In 2000, coronary artery bypass grafting.   b. In 2010, cardiac catheterization done at [facility] which showed normal left ventricular function. Three vessel native coronary disease. Patent vein graft to the PDA, patent vein graft to the diagonal and OM branches, and a patent LIMA to the LAD.   c. Echocardiogram, June 2014 which showed mild  to moderate aortic regurgitation. Normal LVEF.   d. Cardiac catheterization, 09/04/2015, revealing patent URIOSTEGUI to LAD, patent saphenous vein graft to the D1, OM1 and OM2, patent saphenous vein graft to the PDA and native right coronary artery with multiple  stenoses and heavily calcified areas that supply relative little left ventricular myocardium, medical management.     2.  Hypertension.   3. Dyslipidemia.   4. Gastroesophageal reflux disease.   5. Arthritis.   6. Nephrolithiasis.   7. Remote neck surgery.   8. Right total knee replacement.   9. Tonsillectomy.   10. Remote tobacco use with cessation in 1977.       History of Present Illness  Patient returns today for follow up with a history of Coronary artery disease.  Since her last visit, he essentially is unchanged.  Does not see any change with Ranexa.  He still is very active, and as long as he does not eat, has no angina.  He notes for approximately 30 minutes after he eats, he has severe chest pain rating to his shoulders that occurs with exertion, and sometimes occurs with rest.  It is always relieved with sitting still for 10 minutes, recur or gastroenterologist, who has not done any testing per him.  Ranexa has not helped.  He was intolerant to indoor.  He had very active at work, without any issues..     Allergies  "  Allergen Reactions   • Isosorbide Nitrate Dizziness   • Metoprolol Nausea Only         Current Outpatient Prescriptions:   •  aspirin 81 MG EC tablet, Take  by mouth daily., Disp: , Rfl:   •  clopidogrel (PLAVIX) 75 MG tablet, TAKE 1 TABLET DAILY, Disp: 90 tablet, Rfl: 3  •  esomeprazole (NEXIUM) 40 MG capsule, Take  by mouth., Disp: , Rfl:   •  lisinopril-hydrochlorothiazide (PRINZIDE,ZESTORETIC) 20-12.5 MG per tablet, TAKE 1 TABLET DAILY, Disp: 90 tablet, Rfl: 3  •  nitroglycerin (NITROSTAT) 0.4 MG SL tablet, Place 1 tablet under the tongue Every 5 (Five) Minutes As Needed for Chest Pain., Disp: 25 tablet, Rfl: 6  •  pravastatin (PRAVACHOL) 80 MG tablet, Take  by mouth daily., Disp: , Rfl:   •  ranolazine (RANEXA) 500 MG 12 hr tablet, Take 1 tablet by mouth 2 (Two) Times a Day., Disp: 180 tablet, Rfl: 3    The following portions of the patient's history were reviewed and updated as appropriate: allergies, current medications, past family history, past medical history, past social history, past surgical history and problem list.    Review of Systems   Constitution: Negative.   Cardiovascular: Positive for chest pain.   Respiratory: Negative.    Hematologic/Lymphatic: Negative for bleeding problem. Does not bruise/bleed easily.   Skin: Negative for rash.   Musculoskeletal: Negative for muscle weakness and myalgias.   Gastrointestinal: Positive for dysphagia. Negative for heartburn, nausea and vomiting.   Neurological: Negative.           Objective:   Blood pressure 148/78, pulse 61, height 68\" (172.7 cm), weight 228 lb (103 kg).      Physical Exam   Constitutional: He is oriented to person, place, and time. He appears well-developed and well-nourished.   HENT:   Mouth/Throat: Oropharynx is clear and moist.   Neck: No JVD present. Carotid bruit is not present. No thyromegaly present.   Cardiovascular: Regular rhythm, S1 normal, S2 normal, normal heart sounds and intact distal pulses.  Exam reveals no gallop, no S3 " and no S4.    No murmur heard.  Pulses:       Carotid pulses are 2+ on the right side, and 2+ on the left side.       Radial pulses are 2+ on the right side, and 2+ on the left side.   Pulmonary/Chest: Breath sounds normal.   Abdominal: Soft. Bowel sounds are normal. He exhibits no mass. There is no tenderness.   Musculoskeletal: He exhibits no edema.   Neurological: He is alert and oriented to person, place, and time.   Skin: Skin is warm and dry. No rash noted.       Lab Review:      ECG 12 Lead  Date/Time: 8/2/2017 3:06 PM  Performed by: OSCAR SIMON  Authorized by: OSCAR SIMON   Rhythm: sinus rhythm  Comments: Diffuse nonspecific ST abnormality                Assessment:   Zach was seen today for aortic valve insufficiency, atherosclerosis of coronary artery, hyperlipidemia, hypertension, chest pain and dizziness.    Diagnoses and all orders for this visit:    Atherosclerosis of native coronary artery of native heart without angina pectoris    Mixed hyperlipidemia    Essential hypertension    Precordial pain    Other orders  -     ECG 12 Lead      Impression  1. Coronary artery disease.  Cardiac catheter with small vessel RCA disease, not amenable to revascularization  2. Exertional chest pain syndrome, but only after meals.  Possibly representing postprandial angina, however courses been clinically stable for 2 years.  He has absolutely no angina other than immediately post meal.  3. Hypertension controlled  4. Dyslipidemia controlled    Plan:  1. Patient does not see much clinical change with Ranexa, he may have a trial off this.  2. Given his symptoms only occur postprandial Amicar usually are at rest, though still somewhat worried that he has some esophageal disease versus a large hiatal hernia her GI motility issues.  Apparently no symptoms investigation was performed by Dr. Paulino, and I believe a further GI investigation such as EGD or upper GI is warranted.  He has it extremely low cardiovascular  risk for this  3. Revisit in12MO, or sooner as needed.    Lucas Wallis MD

## 2017-10-03 ENCOUNTER — HOSPITAL ENCOUNTER (OUTPATIENT)
Dept: GENERAL RADIOLOGY | Facility: HOSPITAL | Age: 77
Discharge: HOME | End: 2017-10-03
Admitting: FAMILY MEDICINE

## 2017-10-03 ENCOUNTER — OFFICE VISIT (OUTPATIENT)
Dept: FAMILY MEDICINE CLINIC | Facility: CLINIC | Age: 77
End: 2017-10-03

## 2017-10-03 VITALS
HEIGHT: 68 IN | BODY MASS INDEX: 34.78 KG/M2 | DIASTOLIC BLOOD PRESSURE: 90 MMHG | SYSTOLIC BLOOD PRESSURE: 142 MMHG | RESPIRATION RATE: 16 BRPM | HEART RATE: 57 BPM | WEIGHT: 229.5 LBS | TEMPERATURE: 96 F | OXYGEN SATURATION: 98 %

## 2017-10-03 DIAGNOSIS — R07.89 OTHER CHEST PAIN: ICD-10-CM

## 2017-10-03 DIAGNOSIS — I10 ESSENTIAL HYPERTENSION: ICD-10-CM

## 2017-10-03 DIAGNOSIS — M54.2 CERVICAL PAIN (NECK): Primary | ICD-10-CM

## 2017-10-03 DIAGNOSIS — K21.9 GASTROESOPHAGEAL REFLUX DISEASE WITHOUT ESOPHAGITIS: ICD-10-CM

## 2017-10-03 DIAGNOSIS — Z23 INFLUENZA VACCINE NEEDED: ICD-10-CM

## 2017-10-03 DIAGNOSIS — E78.5 DYSLIPIDEMIA: ICD-10-CM

## 2017-10-03 LAB
ALBUMIN SERPL-MCNC: 4.3 G/DL (ref 3.2–4.8)
ALBUMIN/GLOB SERPL: 1.9 G/DL (ref 1.5–2.5)
ALP SERPL-CCNC: 71 U/L (ref 25–100)
ALT SERPL-CCNC: 14 U/L (ref 7–40)
AST SERPL-CCNC: 14 U/L (ref 0–33)
BASOPHILS # BLD AUTO: 0.02 10*3/MM3 (ref 0–0.2)
BASOPHILS NFR BLD AUTO: 0.4 % (ref 0–1)
BILIRUB SERPL-MCNC: 0.4 MG/DL (ref 0.3–1.2)
BUN SERPL-MCNC: 17 MG/DL (ref 9–23)
BUN/CREAT SERPL: 21.3 (ref 7–25)
CALCIUM SERPL-MCNC: 9.2 MG/DL (ref 8.7–10.4)
CHLORIDE SERPL-SCNC: 102 MMOL/L (ref 99–109)
CHOLEST SERPL-MCNC: 161 MG/DL (ref 0–200)
CHOLEST/HDLC SERPL: 2.3 {RATIO}
CO2 SERPL-SCNC: 26 MMOL/L (ref 20–31)
CREAT SERPL-MCNC: 0.8 MG/DL (ref 0.6–1.3)
EOSINOPHIL # BLD AUTO: 0.18 10*3/MM3 (ref 0–0.3)
EOSINOPHIL NFR BLD AUTO: 3.3 % (ref 0–3)
ERYTHROCYTE [DISTWIDTH] IN BLOOD BY AUTOMATED COUNT: 13.2 % (ref 11.3–14.5)
GLOBULIN SER CALC-MCNC: 2.3 GM/DL
GLUCOSE SERPL-MCNC: 112 MG/DL (ref 70–100)
HCT VFR BLD AUTO: 35.1 % (ref 38.9–50.9)
HDLC SERPL-MCNC: 70 MG/DL (ref 40–60)
HGB BLD-MCNC: 11.9 G/DL (ref 13.1–17.5)
IMM GRANULOCYTES # BLD: 0.01 10*3/MM3 (ref 0–0.03)
IMM GRANULOCYTES NFR BLD: 0.2 % (ref 0–0.6)
LDLC SERPL CALC-MCNC: 81 MG/DL (ref 0–100)
LYMPHOCYTES # BLD AUTO: 1.07 10*3/MM3 (ref 0.6–4.8)
LYMPHOCYTES NFR BLD AUTO: 19.7 % (ref 24–44)
MCH RBC QN AUTO: 31.4 PG (ref 27–31)
MCHC RBC AUTO-ENTMCNC: 33.9 G/DL (ref 32–36)
MCV RBC AUTO: 92.6 FL (ref 80–99)
MONOCYTES # BLD AUTO: 0.6 10*3/MM3 (ref 0–1)
MONOCYTES NFR BLD AUTO: 11 % (ref 0–12)
NEUTROPHILS # BLD AUTO: 3.55 10*3/MM3 (ref 1.5–8.3)
NEUTROPHILS NFR BLD AUTO: 65.4 % (ref 41–71)
PLATELET # BLD AUTO: 198 10*3/MM3 (ref 150–450)
POTASSIUM SERPL-SCNC: 4.3 MMOL/L (ref 3.5–5.5)
PROT SERPL-MCNC: 6.6 G/DL (ref 5.7–8.2)
RBC # BLD AUTO: 3.79 10*6/MM3 (ref 4.2–5.76)
SODIUM SERPL-SCNC: 139 MMOL/L (ref 132–146)
TRIGL SERPL-MCNC: 49 MG/DL (ref 0–150)
VLDLC SERPL CALC-MCNC: 9.8 MG/DL
WBC # BLD AUTO: 5.43 10*3/MM3 (ref 3.5–10.8)

## 2017-10-03 PROCEDURE — 90662 IIV NO PRSV INCREASED AG IM: CPT | Performed by: FAMILY MEDICINE

## 2017-10-03 PROCEDURE — 99214 OFFICE O/P EST MOD 30 MIN: CPT | Performed by: FAMILY MEDICINE

## 2017-10-03 PROCEDURE — G0008 ADMIN INFLUENZA VIRUS VAC: HCPCS | Performed by: FAMILY MEDICINE

## 2017-10-03 PROCEDURE — 72040 X-RAY EXAM NECK SPINE 2-3 VW: CPT

## 2017-10-03 RX ORDER — TIZANIDINE 4 MG/1
4 TABLET ORAL EVERY 6 HOURS PRN
Qty: 30 TABLET | Refills: 1 | Status: SHIPPED | OUTPATIENT
Start: 2017-10-03 | End: 2018-01-09 | Stop reason: SDUPTHER

## 2017-10-03 NOTE — PROGRESS NOTES
Assessment/Plan     Problem List Items Addressed This Visit        Cardiovascular and Mediastinum    Essential hypertension    Relevant Orders    CBC & Differential    Comprehensive Metabolic Panel       Digestive    Gastroesophageal reflux disease with esophagitis       Other    Dyslipidemia    Relevant Orders    Comprehensive Metabolic Panel    Lipid Panel With / Chol / HDL Ratio      Other Visit Diagnoses     Cervical pain (neck)    -  Primary    Relevant Medications    tiZANidine (ZANAFLEX) 4 MG tablet    Other Relevant Orders    XR spine cervical 2 vw    Other chest pain        Relevant Orders    Ambulatory Referral to Gastroenterology    CBC & Differential    Comprehensive Metabolic Panel    Influenza vaccine needed        Relevant Orders    Flu Vaccine High Dose PF 65YR+ (Completed)           Follow up: Return for follow up depends on review of labs and testing.     DISCUSSION  Cervical neck pain.  Check x-ray.  Trial of Zanaflex.  Side effects explained.  Does not tolerate prednisone with stomach issues.  Continue Motrin as needed.  Recommend heat.  Further plan once we have x-ray back.    Chest pain which is postprandial.  Cardiology did not feel that this was cardiac related.  May be esophageal spasm and his wife reports that sometimes food gets stuck.  Refer to [facility] gastroenterology for evaluation.  He may need upper endoscopy.  He reported that his previous GI was reluctant to do upper endoscopy because of his cardiac history.    Hypertension.  Stable.  Continue medication.    Hyperlipidemia.  Check labs as noted.    Flu shot today.    He has completed both pneumococcal 23 and Prevnar 13 after the age of 65 and at least one year apart.  Does not need any further pneumonia vaccines at this time      MEDICATIONS PRESCRIBED  Requested Prescriptions     Signed Prescriptions Disp Refills   • tiZANidine (ZANAFLEX) 4 MG tablet 30 tablet 1     Sig: Take 1 tablet by mouth Every 6 (Six) Hours As Needed for  Muscle Spasms.              -------------------------------------------    Subjective     Chief Complaint   Patient presents with   • Neck Pain     around shoulder and its stiff and cracking and pop   • pneumonia and flu shots       Neck Pain    This is a new problem. The current episode started in the past 7 days (x 5 days). The problem occurs daily. The problem has been unchanged. The pain is associated with nothing. The pain is present in the left side and right side (+ stiff. started on the right and now to the left, posterior). The quality of the pain is described as aching (stiff). The pain is moderate. The symptoms are aggravated by twisting (dizzy when turning ). Associated symptoms include chest pain (after eating), numbness (occ numbness in the both arms and tingling, yesterday, pain posterior left arm) and tingling. Pertinent negatives include no pain with swallowing, syncope (dizzy at times) or trouble swallowing. He has tried NSAIDs (motrin and aleve and no help) for the symptoms. The treatment provided mild relief.   Hypertension   This is a chronic problem. The current episode started more than 1 year ago. The problem is unchanged. The problem is controlled. Associated symptoms include chest pain (after eating) and neck pain. There are no associated agents to hypertension. Risk factors for coronary artery disease include male gender and dyslipidemia. The current treatment provides moderate improvement. There are no compliance problems.  Hypertensive end-organ damage includes CAD/MI.       h/o C6 neck surg Dr Blair. Early 2000s  feels like when had surg before    Chest pain  Has seen Dr Wallis. Not heart related  Occurs after eating, 20 -30 min, + pain in esophagus. Has to sit down not able to walk after eating  OK if does not eat  No pain if not eating  Never had EGD  had seen Dr Paulino and refused to do EGD due to heart conditions    History of hyperlipidemia and coronary artery disease.  Due for blood  "work.  Has not eaten today.  No side effects of medication.  Other than the chest pain as noted when eating, he is not having any other issues.    Past Medical History,Medications, Allergies, and social history was reviewed.    Review of Systems   Constitutional: Negative.    HENT: Negative.  Negative for trouble swallowing.    Respiratory: Negative.    Cardiovascular: Positive for chest pain (after eating). Negative for syncope (dizzy at times).   Gastrointestinal: Negative.    Genitourinary: Negative.    Musculoskeletal: Positive for neck pain.   Neurological: Positive for tingling and numbness (occ numbness in the both arms and tingling, yesterday, pain posterior left arm).   Psychiatric/Behavioral: Negative.        Objective     Vitals:    10/03/17 0911 10/03/17 0934   BP: 124/76 142/90   Pulse: 57    Resp: 16    Temp: 96 °F (35.6 °C)    TempSrc: Temporal Artery     SpO2: 98%    Weight: 229 lb 8 oz (104 kg)    Height: 68\" (172.7 cm)         Physical Exam   Constitutional: He is oriented to person, place, and time. Vital signs are normal. He appears well-developed and well-nourished.   HENT:   Head: Normocephalic and atraumatic.   Right Ear: Hearing and external ear normal.   Left Ear: Hearing and external ear normal.   Eyes: Conjunctivae, EOM and lids are normal. Pupils are equal, round, and reactive to light.   Neck: Normal range of motion. Neck supple. No thyromegaly present.   Cardiovascular: Normal rate, regular rhythm and normal heart sounds.  Exam reveals no friction rub.    No murmur heard.  Pulmonary/Chest: Effort normal and breath sounds normal. No respiratory distress. He has no wheezes. He has no rales.   Abdominal: Normal appearance. He exhibits no mass.   Musculoskeletal: He exhibits no edema.        Cervical back: He exhibits decreased range of motion (increased pain with turning to left and right) and tenderness (paraspinous tenderness). He exhibits no bony tenderness.   Neurological: He is alert " and oriented to person, place, and time. He has normal strength. No sensory deficit. He exhibits normal muscle tone.   Reflex Scores:       Bicep reflexes are 2+ on the right side and 2+ on the left side.  Skin: Skin is warm and dry.   Psychiatric: He has a normal mood and affect. His speech is normal and behavior is normal. Cognition and memory are normal.   Nursing note and vitals reviewed.              Antonio Hatch MD

## 2017-10-25 ENCOUNTER — OFFICE VISIT (OUTPATIENT)
Dept: GASTROENTEROLOGY | Facility: CLINIC | Age: 77
End: 2017-10-25

## 2017-10-25 VITALS
TEMPERATURE: 97.6 F | BODY MASS INDEX: 34.46 KG/M2 | HEART RATE: 61 BPM | SYSTOLIC BLOOD PRESSURE: 146 MMHG | HEIGHT: 68 IN | DIASTOLIC BLOOD PRESSURE: 68 MMHG | OXYGEN SATURATION: 97 % | WEIGHT: 227.4 LBS

## 2017-10-25 DIAGNOSIS — R13.10 DYSPHAGIA, UNSPECIFIED TYPE: Primary | ICD-10-CM

## 2017-10-25 DIAGNOSIS — R07.9 CHEST PAIN, UNSPECIFIED TYPE: ICD-10-CM

## 2017-10-25 PROCEDURE — 99204 OFFICE O/P NEW MOD 45 MIN: CPT | Performed by: INTERNAL MEDICINE

## 2017-10-25 NOTE — PROGRESS NOTES
PCP: Antonio Hatch MD    Chief Complaint   Patient presents with   • Abdominal Pain     Epigastric       History of Present Illness:   HPI  Mr. Ramsey is a 77-year-old with a history of coronary disease, hyperlipidemia and hypertension.  The patient presents with a greater than one-year history of chest discomfort that occurs with meals.  He denies any chest pain with walking or other exertional activities.  He denies any shortness of breath. The patient states that if he does not eat he does not have the chest discomfort.  The patient states that his appetite is good.  There is no history of nausea or vomiting.  He does experience a sense of difficult swallowing primarily with solid food.  He has not noticed major problems with liquids at this time.  There is no history of unexplained weight loss.  He does admit to a sense of regurgitation of food contents at times.  The patient has not been aware of any increased saliva on the pillow or food particles on the pillow when he wakes up in the morning.  He has never undergone an upper endoscopic study.  The patient has been evaluated by Dr. Wallis and no further studies are planned at this time.  Patient does not smoke cigarettes.  He does not drink any alcohol on a  regular basis.  There is no family history of gastrointestinal cancer in first-degree relatives.  Past Medical History:   Diagnosis Date   • Arthritis    • Coronary artery disease    • Dyslipidemia    • GERD (gastroesophageal reflux disease)    • Hypertension    • Nephrolithiasis    • Tobacco use     Remote tobacco use with cessation in 1977.         Past Surgical History:   Procedure Laterality Date   • CORONARY ARTERY BYPASS GRAFT     • NECK SURGERY      Remote neck surgery.    • OTHER SURGICAL HISTORY      Lithotomy   • TONSILLECTOMY     • TOTAL KNEE ARTHROPLASTY Right     Right total knee replacement.          Current Outpatient Prescriptions:   •  aspirin 81 MG EC tablet, Take  by mouth daily.,  Disp: , Rfl:   •  clopidogrel (PLAVIX) 75 MG tablet, TAKE 1 TABLET DAILY, Disp: 90 tablet, Rfl: 3  •  esomeprazole (NEXIUM) 40 MG capsule, Take  by mouth., Disp: , Rfl:   •  lisinopril-hydrochlorothiazide (PRINZIDE,ZESTORETIC) 20-12.5 MG per tablet, TAKE 1 TABLET DAILY, Disp: 90 tablet, Rfl: 3  •  nitroglycerin (NITROSTAT) 0.4 MG SL tablet, Place 1 tablet under the tongue Every 5 (Five) Minutes As Needed for Chest Pain., Disp: 25 tablet, Rfl: 6  •  pravastatin (PRAVACHOL) 80 MG tablet, Take  by mouth daily., Disp: , Rfl:   •  ranolazine (RANEXA) 500 MG 12 hr tablet, Take 1 tablet by mouth 2 (Two) Times a Day. (Patient taking differently: Take 500 mg by mouth Daily.), Disp: 180 tablet, Rfl: 3  •  tiZANidine (ZANAFLEX) 4 MG tablet, Take 1 tablet by mouth Every 6 (Six) Hours As Needed for Muscle Spasms., Disp: 30 tablet, Rfl: 1    Allergies   Allergen Reactions   • Isosorbide Nitrate Dizziness   • Metoprolol Nausea Only       Family History   Problem Relation Age of Onset   • Diabetes Mother    • Coronary artery disease Father        Social History     Social History   • Marital status:      Spouse name: N/A   • Number of children: N/A   • Years of education: N/A     Occupational History   • Not on file.     Social History Main Topics   • Smoking status: Former Smoker     Quit date: 1977   • Smokeless tobacco: Never Used   • Alcohol use No   • Drug use: No   • Sexual activity: Defer     Other Topics Concern   • Not on file     Social History Narrative       Review of Systems   Constitutional: Positive for fatigue. Negative for activity change, appetite change, fever and unexpected weight change.   HENT: Positive for trouble swallowing. Negative for dental problem, hearing loss, mouth sores, postnasal drip, sneezing and voice change.    Eyes: Negative for pain, redness, itching and visual disturbance.   Respiratory: Positive for chest tightness. Negative for cough, choking, shortness of breath and wheezing.     Cardiovascular: Positive for chest pain. Negative for palpitations and leg swelling.   Gastrointestinal: Negative for abdominal distention, abdominal pain, anal bleeding, blood in stool, constipation, diarrhea, nausea, rectal pain and vomiting.   Endocrine: Negative for cold intolerance, heat intolerance, polydipsia, polyphagia and polyuria.   Genitourinary: Negative.  Negative for dysuria, enuresis, flank pain, hematuria and urgency.   Musculoskeletal: Negative for arthralgias, back pain, gait problem, joint swelling and myalgias.   Skin: Negative for color change, pallor and rash.   Allergic/Immunologic: Negative for environmental allergies, food allergies and immunocompromised state.   Neurological: Negative for dizziness, tremors, seizures, facial asymmetry, speech difficulty, numbness and headaches.   Hematological: Negative for adenopathy.   Psychiatric/Behavioral: Negative for behavioral problems, confusion, dysphoric mood, hallucinations and self-injury.       Vitals:    10/25/17 0906   BP: 146/68   Pulse: 61   Temp: 97.6 °F (36.4 °C)   SpO2: 97%       Physical Exam   Constitutional: He is oriented to person, place, and time. He appears well-nourished. No distress.   HENT:   Head: Normocephalic and atraumatic.   Mouth/Throat: Oropharynx is clear and moist. No oropharyngeal exudate.   Eyes: EOM are normal. Pupils are equal, round, and reactive to light. No scleral icterus.   Neck: Neck supple. No thyromegaly present.   Cardiovascular: Normal rate and regular rhythm.  Exam reveals no gallop.    Murmur heard.  Pulmonary/Chest: Effort normal and breath sounds normal. He has no wheezes. He has no rales.   Abdominal: Soft. Bowel sounds are normal. He exhibits no distension. There is no tenderness. There is no guarding.   Right lobe of liver felt below costal margin   Musculoskeletal: Normal range of motion. He exhibits no edema or tenderness.   Lymphadenopathy:     He has no cervical adenopathy.   Neurological:  He is alert and oriented to person, place, and time. He exhibits normal muscle tone.   Skin: Skin is dry. No erythema. No pallor.   Psychiatric: He has a normal mood and affect. His behavior is normal. Thought content normal.   Vitals reviewed.      Zach was seen today for abdominal pain.    Diagnoses and all orders for this visit:    Dysphagia, unspecified type  -     Esophagogastroduodenoscopy; Future    Chest pain, unspecified type  -     Esophagogastroduodenoscopy; Future  The clinical history suggest possible esophageal motility disorder such as achalasia.  Also the differential includes nonspecific esophageal motility disorder but a luminal abnormality such as malignancy cannot be excluded.  Patient has a history of coronary disease but has been evaluated by cardiology without any current abnormalities attributed to the current history.      Plan: Will proceed with upper endoscopy for further evaluation.           Discussed that pending the endoscopy he may need other studies such as esophageal manometry.      I spent over 50% of the office visit counseling and answering questions from the patient.

## 2017-11-15 ENCOUNTER — TELEPHONE (OUTPATIENT)
Dept: GASTROENTEROLOGY | Facility: CLINIC | Age: 77
End: 2017-11-15

## 2017-11-15 NOTE — TELEPHONE ENCOUNTER
Called patient back. Informed him there isn't a procedure packet for EGD. Make sure he is NPO 12 midnight the day before. Patient voiced understanding.

## 2017-11-17 ENCOUNTER — OUTSIDE FACILITY SERVICE (OUTPATIENT)
Dept: GASTROENTEROLOGY | Facility: CLINIC | Age: 77
End: 2017-11-17

## 2017-11-17 ENCOUNTER — LAB REQUISITION (OUTPATIENT)
Dept: LAB | Facility: HOSPITAL | Age: 77
End: 2017-11-17

## 2017-11-17 DIAGNOSIS — R13.10 DYSPHAGIA: ICD-10-CM

## 2017-11-17 DIAGNOSIS — R07.9 CHEST PAIN: ICD-10-CM

## 2017-11-17 PROCEDURE — 43249 ESOPH EGD DILATION <30 MM: CPT | Performed by: INTERNAL MEDICINE

## 2017-11-17 PROCEDURE — 43239 EGD BIOPSY SINGLE/MULTIPLE: CPT | Performed by: INTERNAL MEDICINE

## 2017-11-17 PROCEDURE — 88305 TISSUE EXAM BY PATHOLOGIST: CPT | Performed by: INTERNAL MEDICINE

## 2017-11-20 LAB
CYTO UR: NORMAL
LAB AP CASE REPORT: NORMAL
LAB AP CLINICAL INFORMATION: NORMAL
Lab: NORMAL
PATH REPORT.FINAL DX SPEC: NORMAL
PATH REPORT.GROSS SPEC: NORMAL

## 2017-11-22 ENCOUNTER — TELEPHONE (OUTPATIENT)
Dept: GASTROENTEROLOGY | Facility: CLINIC | Age: 77
End: 2017-11-22

## 2017-11-22 NOTE — TELEPHONE ENCOUNTER
----- Message from Sathish Ruiz MD sent at 11/21/2017  3:34 PM EST -----  Let Mr. Ramsey know there were some changes of reflux. I recommend that he take Nexium daily. If no improvement will need esophageal manometry.  Thanks,  GMW

## 2017-11-28 ENCOUNTER — TELEPHONE (OUTPATIENT)
Dept: GASTROENTEROLOGY | Facility: CLINIC | Age: 77
End: 2017-11-28

## 2017-11-29 NOTE — TELEPHONE ENCOUNTER
Called patient back. Gave him his biopsy results. Patient stated insurance will not pay for the Nexium anymore. Patient will call his insurance to see which PPI they will cover.  He will call us back.

## 2017-11-30 DIAGNOSIS — K21.00 GASTROESOPHAGEAL REFLUX DISEASE WITH ESOPHAGITIS: Primary | ICD-10-CM

## 2017-11-30 RX ORDER — PANTOPRAZOLE SODIUM 40 MG/1
40 TABLET, DELAYED RELEASE ORAL DAILY
Qty: 90 TABLET | Refills: 3 | Status: SHIPPED | OUTPATIENT
Start: 2017-11-30 | End: 2018-01-01 | Stop reason: SDUPTHER

## 2017-12-01 ENCOUNTER — TELEPHONE (OUTPATIENT)
Dept: GASTROENTEROLOGY | Facility: CLINIC | Age: 77
End: 2017-12-01

## 2017-12-01 NOTE — TELEPHONE ENCOUNTER
----- Message from Sathish Ruiz MD sent at 11/30/2017  4:30 PM EST -----  Rui  I e-scribed the Protonix.  Thanks,  GMW  ----- Message -----     From: MANUEL Sanchez     Sent: 11/30/2017   7:51 AM       To: MD Dr Joseph Guillen,  Patient's insurance will not cover Nexium anymore but they will pay for  Omeprazole or Pantoprazole. Can you please send one of these PPI's to his pharmacy? Thanks   ----- Message -----     From: Sathish Ruiz MD     Sent: 11/21/2017   3:34 PM       To: MANUEL Sanchez    Let Mr. Ramsey know there were some changes of reflux. I recommend that he take Nexium daily. If no improvement will need esophageal manometry.  Thanks,  GMW

## 2017-12-29 RX ORDER — PRAVASTATIN SODIUM 80 MG/1
80 TABLET ORAL DAILY
Qty: 90 TABLET | Refills: 4 | Status: SHIPPED | OUTPATIENT
Start: 2017-12-29 | End: 2019-01-01 | Stop reason: SDUPTHER

## 2018-01-01 ENCOUNTER — OFFICE VISIT (OUTPATIENT)
Dept: FAMILY MEDICINE CLINIC | Facility: CLINIC | Age: 78
End: 2018-01-01

## 2018-01-01 VITALS
SYSTOLIC BLOOD PRESSURE: 122 MMHG | HEIGHT: 68 IN | DIASTOLIC BLOOD PRESSURE: 72 MMHG | WEIGHT: 227.5 LBS | BODY MASS INDEX: 34.48 KG/M2 | RESPIRATION RATE: 18 BRPM | HEART RATE: 76 BPM | TEMPERATURE: 97.6 F

## 2018-01-01 DIAGNOSIS — Z23 NEED FOR INFLUENZA VACCINATION: ICD-10-CM

## 2018-01-01 DIAGNOSIS — I25.118 CORONARY ARTERY DISEASE INVOLVING NATIVE CORONARY ARTERY OF NATIVE HEART WITH OTHER FORM OF ANGINA PECTORIS (HCC): ICD-10-CM

## 2018-01-01 DIAGNOSIS — R35.1 NOCTURIA: Primary | ICD-10-CM

## 2018-01-01 DIAGNOSIS — I10 ESSENTIAL HYPERTENSION: ICD-10-CM

## 2018-01-01 DIAGNOSIS — R39.16 STRAINS TO URINATE: ICD-10-CM

## 2018-01-01 DIAGNOSIS — Z12.5 PROSTATE CANCER SCREENING: ICD-10-CM

## 2018-01-01 DIAGNOSIS — D64.9 ANEMIA, UNSPECIFIED TYPE: ICD-10-CM

## 2018-01-01 DIAGNOSIS — K21.00 GASTROESOPHAGEAL REFLUX DISEASE WITH ESOPHAGITIS: ICD-10-CM

## 2018-01-01 DIAGNOSIS — H93.11 TINNITUS OF RIGHT EAR: ICD-10-CM

## 2018-01-01 LAB
ALBUMIN SERPL-MCNC: 4.34 G/DL (ref 3.2–4.8)
ALBUMIN/GLOB SERPL: 2.3 G/DL (ref 1.5–2.5)
ALP SERPL-CCNC: 61 U/L (ref 25–100)
ALT SERPL-CCNC: 16 U/L (ref 7–40)
AST SERPL-CCNC: 18 U/L (ref 0–33)
BASOPHILS # BLD AUTO: 0.02 10*3/MM3 (ref 0–0.2)
BASOPHILS NFR BLD AUTO: 0.4 % (ref 0–1)
BILIRUB SERPL-MCNC: 0.6 MG/DL (ref 0.3–1.2)
BUN SERPL-MCNC: 19 MG/DL (ref 9–23)
BUN/CREAT SERPL: 21.8 (ref 7–25)
CALCIUM SERPL-MCNC: 9.2 MG/DL (ref 8.7–10.4)
CHLORIDE SERPL-SCNC: 104 MMOL/L (ref 99–109)
CHOLEST SERPL-MCNC: 134 MG/DL (ref 0–200)
CHOLEST/HDLC SERPL: 1.84 {RATIO}
CO2 SERPL-SCNC: 30 MMOL/L (ref 20–31)
CREAT SERPL-MCNC: 0.87 MG/DL (ref 0.6–1.3)
EOSINOPHIL # BLD AUTO: 0.09 10*3/MM3 (ref 0–0.3)
EOSINOPHIL NFR BLD AUTO: 1.7 % (ref 0–3)
ERYTHROCYTE [DISTWIDTH] IN BLOOD BY AUTOMATED COUNT: 13 % (ref 11.3–14.5)
GLOBULIN SER CALC-MCNC: 1.9 GM/DL
GLUCOSE SERPL-MCNC: 88 MG/DL (ref 70–100)
HCT VFR BLD AUTO: 36.9 % (ref 38.9–50.9)
HDLC SERPL-MCNC: 73 MG/DL (ref 40–60)
HGB BLD-MCNC: 12.4 G/DL (ref 13.1–17.5)
IMM GRANULOCYTES # BLD: 0.01 10*3/MM3 (ref 0–0.03)
IMM GRANULOCYTES NFR BLD: 0.2 % (ref 0–0.6)
LDLC SERPL CALC-MCNC: 51 MG/DL (ref 0–100)
LYMPHOCYTES # BLD AUTO: 1.19 10*3/MM3 (ref 0.6–4.8)
LYMPHOCYTES NFR BLD AUTO: 22.2 % (ref 24–44)
MCH RBC QN AUTO: 32 PG (ref 27–31)
MCHC RBC AUTO-ENTMCNC: 33.6 G/DL (ref 32–36)
MCV RBC AUTO: 95.3 FL (ref 80–99)
MONOCYTES # BLD AUTO: 0.73 10*3/MM3 (ref 0–1)
MONOCYTES NFR BLD AUTO: 13.6 % (ref 0–12)
NEUTROPHILS # BLD AUTO: 3.32 10*3/MM3 (ref 1.5–8.3)
NEUTROPHILS NFR BLD AUTO: 61.9 % (ref 41–71)
PLATELET # BLD AUTO: 202 10*3/MM3 (ref 150–450)
POTASSIUM SERPL-SCNC: 4.3 MMOL/L (ref 3.5–5.5)
PROT SERPL-MCNC: 6.2 G/DL (ref 5.7–8.2)
PSA SERPL-MCNC: 2.71 NG/ML (ref 0–4)
RBC # BLD AUTO: 3.87 10*6/MM3 (ref 4.2–5.76)
SODIUM SERPL-SCNC: 139 MMOL/L (ref 132–146)
TRIGL SERPL-MCNC: 49 MG/DL (ref 0–150)
VLDLC SERPL CALC-MCNC: 9.8 MG/DL
WBC # BLD AUTO: 5.36 10*3/MM3 (ref 3.5–10.8)

## 2018-01-01 PROCEDURE — 90662 IIV NO PRSV INCREASED AG IM: CPT | Performed by: FAMILY MEDICINE

## 2018-01-01 PROCEDURE — G0008 ADMIN INFLUENZA VIRUS VAC: HCPCS | Performed by: FAMILY MEDICINE

## 2018-01-01 PROCEDURE — 99214 OFFICE O/P EST MOD 30 MIN: CPT | Performed by: FAMILY MEDICINE

## 2018-01-01 RX ORDER — PANTOPRAZOLE SODIUM 40 MG/1
TABLET, DELAYED RELEASE ORAL
Qty: 90 TABLET | Refills: 3 | Status: SHIPPED | OUTPATIENT
Start: 2018-01-01

## 2018-01-09 ENCOUNTER — OFFICE VISIT (OUTPATIENT)
Dept: FAMILY MEDICINE CLINIC | Facility: CLINIC | Age: 78
End: 2018-01-09

## 2018-01-09 VITALS
WEIGHT: 226.5 LBS | SYSTOLIC BLOOD PRESSURE: 142 MMHG | HEIGHT: 68 IN | BODY MASS INDEX: 34.33 KG/M2 | HEART RATE: 70 BPM | RESPIRATION RATE: 16 BRPM | TEMPERATURE: 97.8 F | OXYGEN SATURATION: 98 % | DIASTOLIC BLOOD PRESSURE: 68 MMHG

## 2018-01-09 DIAGNOSIS — G89.29 CHRONIC NECK PAIN: ICD-10-CM

## 2018-01-09 DIAGNOSIS — M54.2 CERVICAL PAIN (NECK): ICD-10-CM

## 2018-01-09 DIAGNOSIS — M54.2 CHRONIC NECK PAIN: ICD-10-CM

## 2018-01-09 DIAGNOSIS — I25.709 CORONARY ARTERY DISEASE INVOLVING CORONARY BYPASS GRAFT OF NATIVE HEART WITH ANGINA PECTORIS (HCC): ICD-10-CM

## 2018-01-09 DIAGNOSIS — E78.2 MIXED HYPERLIPIDEMIA: ICD-10-CM

## 2018-01-09 DIAGNOSIS — Z00.00 MEDICARE ANNUAL WELLNESS VISIT, SUBSEQUENT: Primary | ICD-10-CM

## 2018-01-09 DIAGNOSIS — I10 ESSENTIAL HYPERTENSION: ICD-10-CM

## 2018-01-09 PROCEDURE — G0439 PPPS, SUBSEQ VISIT: HCPCS | Performed by: FAMILY MEDICINE

## 2018-01-09 RX ORDER — TIZANIDINE 4 MG/1
4 TABLET ORAL EVERY 6 HOURS PRN
Qty: 30 TABLET | Refills: 1 | Status: SHIPPED | OUTPATIENT
Start: 2018-01-09 | End: 2018-04-26 | Stop reason: SDUPTHER

## 2018-01-09 NOTE — PROGRESS NOTES
QUICK REFERENCE INFORMATION:  The ABCs of the Annual Wellness Visit    Subsequent Medicare Wellness Visit    HEALTH RISK ASSESSMENT    1940    Recent Hospitalizations:  No hospitalization(s) within the last year..        Current Medical Providers:  Patient Care Team:  Antonio Hatch MD as PCP - General  Antonio Hatch MD as PCP - Family Medicine  Antonio Hatch MD as PCP - Claims Attributed  Dr Wallis Cardiology  Dr Joseph GONZALES      Smoking Status:  History   Smoking Status   • Former Smoker   • Quit date: 1977   Smokeless Tobacco   • Never Used       Alcohol Consumption:  History   Alcohol Use No       Depression Screen:   PHQ-2/PHQ-9 Depression Screening 1/9/2018   Little interest or pleasure in doing things 0   Feeling down, depressed, or hopeless 0   Total Score 0       Health Habits and Functional and Cognitive Screening:  Functional & Cognitive Status 1/9/2018   Do you have difficulty preparing food and eating? No   Do you have difficulty bathing yourself, getting dressed or grooming yourself? No   Do you have difficulty using the toilet? No   Do you have difficulty moving around from place to place? No   Do you have trouble with steps or getting out of a bed or a chair? No   In the past year have you fallen or experienced a near fall? No   Current Diet Well Balanced Diet   Dental Exam Not up to date   Eye Exam Not up to date   Exercise (times per week) 2 times per week   Current Exercise Activities Include Walking   Do you need help using the phone?  No   Are you deaf or do you have serious difficulty hearing?  Yes   Do you need help with transportation? No   Do you need help shopping? No   Do you need help preparing meals?  No   Do you need help with housework?  No   Do you need help with laundry? No   Do you need help taking your medications? No   Do you need help managing money? No   Have you felt unusual stress, anger or loneliness in the last month? No   Who do you live with? Spouse   If you need  help, do you have trouble finding someone available to you? No   Have you been bothered in the last four weeks by sexual problems? No   Do you have difficulty concentrating, remembering or making decisions? Yes   Eye exam pending      Does the patient have evidence of cognitive impairment? Yes, not as good and forgetful at times. Wife notices it at times.   Decreased hearing.   No hearing eval      Aspirin use counseling: Taking ASA appropriately as indicated      Recent Lab Results:  CMP:  Lab Results   Component Value Date     (H) 10/03/2017    BUN 17 10/03/2017    CREATININE 0.80 10/03/2017    EGFRIFNONA 94 10/03/2017    EGFRIFAFRI 114 10/03/2017    BCR 21.3 10/03/2017     10/03/2017    K 4.3 10/03/2017    CO2 26.0 10/03/2017    CALCIUM 9.2 10/03/2017    PROTENTOTREF 6.6 10/03/2017    ALBUMIN 4.30 10/03/2017    LABGLOBREF 2.3 10/03/2017    LABIL2 1.9 10/03/2017    BILITOT 0.4 10/03/2017    ALKPHOS 71 10/03/2017    AST 14 10/03/2017    ALT 14 10/03/2017     Lipid Panel:  Lab Results   Component Value Date    TRIG 49 10/03/2017    HDL 70 (H) 10/03/2017    VLDL 9.8 10/03/2017    LDLDIRECT 55 09/04/2015     HbA1c:  Lab Results   Component Value Date    HGBA1C 5.6 09/04/2015       Visual Acuity:  No exam data present    Age-appropriate Screening Schedule:  Refer to the list below for future screening recommendations based on patient's age, sex and/or medical conditions. Orders for these recommended tests are listed in the plan section. The patient has been provided with a written plan.    Health Maintenance   Topic Date Due   • TDAP/TD VACCINES (1 - Tdap) 04/26/1960   • ZOSTER VACCINE  07/21/2016   • LIPID PANEL  10/03/2018   • COLONOSCOPY  02/02/2025   • INFLUENZA VACCINE  Completed   • PNEUMOCOCCAL VACCINES (65+ LOW/MEDIUM RISK)  Completed        Subjective   History of Present Illness    Zach Ramsey is a 77 y.o. male who presents for an Subsequent Wellness Visit.    Chest pain   Saw   Joseph  [facility] exertional chest pain and Dr Scherer did not think was heart.   Had EGD and narrowing.   + hiatal hernia found (dx years ago)  Stretched esophagus and helped a little and now sx back again some  If eats and then carries something, + pain   No pain if does not eat.    Neck pain   Zanaflex as needed (night only ).   Helps some but causes sleepiness      The following portions of the patient's history were reviewed and updated as appropriate: allergies, current medications, past family history, past medical history, past social history, past surgical history and problem list.    Outpatient Medications Prior to Visit   Medication Sig Dispense Refill   • aspirin 81 MG EC tablet Take  by mouth daily.     • clopidogrel (PLAVIX) 75 MG tablet TAKE 1 TABLET DAILY 90 tablet 3   • lisinopril-hydrochlorothiazide (PRINZIDE,ZESTORETIC) 20-12.5 MG per tablet TAKE 1 TABLET DAILY 90 tablet 3   • nitroglycerin (NITROSTAT) 0.4 MG SL tablet Place 1 tablet under the tongue Every 5 (Five) Minutes As Needed for Chest Pain. 25 tablet 6   • pantoprazole (PROTONIX) 40 MG EC tablet Take 1 tablet by mouth Daily. 90 tablet 3   • pravastatin (PRAVACHOL) 80 MG tablet Take 1 tablet by mouth Daily. 90 tablet 4   • ranolazine (RANEXA) 500 MG 12 hr tablet Take 1 tablet by mouth 2 (Two) Times a Day. (Patient taking differently: Take 500 mg by mouth Daily.) 180 tablet 3   • tiZANidine (ZANAFLEX) 4 MG tablet Take 1 tablet by mouth Every 6 (Six) Hours As Needed for Muscle Spasms. 30 tablet 1     No facility-administered medications prior to visit.        Patient Active Problem List   Diagnosis   • Aortic valve insufficiency   • Atherosclerosis of coronary artery   • Gastroesophageal reflux disease with esophagitis   • Hyperlipidemia   • Essential hypertension   • Coronary artery disease   • Dyslipidemia   • Arthritis   • Nephrolithiasis   • Hyperglycemia   • Asthma   • Peripheral arterial disease       Advance Care Planning:  has NO advance  directive - information provided to the patient today    Identification of Risk Factors:  Risk factors include: weight , cardiovascular risk, inactivity, cognitive impairment, hearing limitations and neck pain .    Review of Systems   Constitutional: Negative.    HENT: Negative.         Some hearing loss   Respiratory: Negative.    Cardiovascular: Positive for chest pain.   Gastrointestinal: Negative for abdominal pain.   Musculoskeletal: Positive for arthralgias, neck pain and neck stiffness.   Neurological: Negative.    Psychiatric/Behavioral: Negative.         ? Memory issues       Compared to one year ago, the patient feels his physical health is the same.  Compared to one year ago, the patient feels his mental health is worse.Due to memory issues      Objective     Physical Exam   Constitutional: He is oriented to person, place, and time. Vital signs are normal. He appears well-developed and well-nourished.   HENT:   Head: Normocephalic and atraumatic.   Right Ear: Hearing, tympanic membrane, external ear and ear canal normal.   Left Ear: Hearing, tympanic membrane, external ear and ear canal normal.   Nose: Nose normal.   Mouth/Throat: Oropharynx is clear and moist.   Eyes: Conjunctivae, EOM and lids are normal. Pupils are equal, round, and reactive to light.   Neck: Normal range of motion. Neck supple. No thyromegaly present.   Cardiovascular: Normal rate, regular rhythm and normal heart sounds.  Exam reveals no friction rub.    No murmur heard.  Pulmonary/Chest: Effort normal and breath sounds normal. No respiratory distress. He has no wheezes. He has no rales.   Abdominal: Soft. Normal appearance and bowel sounds are normal. He exhibits no distension and no mass. There is no tenderness. There is no rebound and no guarding.   Musculoskeletal: He exhibits no edema.   Mild decreased range of motion of neck.  Mild tenderness on the right lateral side.  No focal weakness noted of the upper extremity.  "  Neurological: He is alert and oriented to person, place, and time. He has normal strength. No cranial nerve deficit.   Skin: Skin is warm and dry.   Psychiatric: He has a normal mood and affect. His speech is normal and behavior is normal. Cognition and memory are normal.   Nursing note and vitals reviewed.      Vitals:    18 1106   BP: 142/68   Pulse: 70   Resp: 16   Temp: 97.8 °F (36.6 °C)   TempSrc: Temporal Artery    SpO2: 98%   Weight: 103 kg (226 lb 8 oz)   Height: 172.7 cm (67.99\")       Body mass index is 34.45 kg/(m^2).  Discussed the patient's BMI with him. BMI is above normal parameters. Follow-up plan includes:  discussed increased activity and decreased carbs in diet.    ATTENTION  What is the year: correct  What is the month of the year: correct  What is the day of the week?: correct  What is the date?: correct  MEMORY  Repeat address three times, only score third attempt: [address], [place]: 7  HOW MANY ANIMALS DID THE PATIENT NAME  Verbal Fluency -- Animal Names (0-25): 14-16 (14)  CLOCK DRAWING  Clock Drawing: All Correct  MEMORY RECALL  Tell me what you remember about that name and address we were repeating at the beginnin  ACE TOTAL SCORE  Total ACE Score - <25/30 strongly suggests cognitive impairment; <21/30 almost certainly shows dementia: 28       Assessment/Plan   Patient Self-Management and Personalized Health Advice  The patient has been provided with information about: diet, exercise, weight management, fall prevention, designing advance directives and mental health concerns and preventive services including:   · Advance directive, Fall Risk assessment done.    Visit Diagnoses:    ICD-10-CM ICD-9-CM   1. Medicare annual wellness visit, subsequent Z00.00 V70.0   2. Chronic neck pain M54.2 723.1    G89.29 338.29   3. Essential hypertension I10 401.9   4. Coronary artery disease involving coronary bypass graft of native heart with angina pectoris " I25.709 414.05     413.9   5. Mixed hyperlipidemia E78.2 272.2   6. Cervical pain (neck) M54.2 723.1       Orders Placed This Encounter   Procedures   • Ambulatory Referral to Physical Therapy Evaluate and treat     Referral Priority:   Routine     Referral Type:   Therapy     Referral Reason:   Specialty Services Required     Requested Specialty:   Physical Therapy     Number of Visits Requested:   1       Outpatient Encounter Prescriptions as of 1/9/2018   Medication Sig Dispense Refill   • aspirin 81 MG EC tablet Take  by mouth daily.     • clopidogrel (PLAVIX) 75 MG tablet TAKE 1 TABLET DAILY 90 tablet 3   • lisinopril-hydrochlorothiazide (PRINZIDE,ZESTORETIC) 20-12.5 MG per tablet TAKE 1 TABLET DAILY 90 tablet 3   • nitroglycerin (NITROSTAT) 0.4 MG SL tablet Place 1 tablet under the tongue Every 5 (Five) Minutes As Needed for Chest Pain. 25 tablet 6   • pantoprazole (PROTONIX) 40 MG EC tablet Take 1 tablet by mouth Daily. 90 tablet 3   • pravastatin (PRAVACHOL) 80 MG tablet Take 1 tablet by mouth Daily. 90 tablet 4   • ranolazine (RANEXA) 500 MG 12 hr tablet Take 1 tablet by mouth 2 (Two) Times a Day. (Patient taking differently: Take 500 mg by mouth Daily.) 180 tablet 3   • tiZANidine (ZANAFLEX) 4 MG tablet Take 1 tablet by mouth Every 6 (Six) Hours As Needed for Muscle Spasms. 30 tablet 1   • [DISCONTINUED] tiZANidine (ZANAFLEX) 4 MG tablet Take 1 tablet by mouth Every 6 (Six) Hours As Needed for Muscle Spasms. 30 tablet 1     No facility-administered encounter medications on file as of 1/9/2018.        Reviewed use of high risk medication in the elderly: yes  Reviewed for potential of harmful drug interactions in the elderly: yes    Follow Up:  Return in about 3 months (around 4/9/2018), or if symptoms worsen or fail to improve.     An After Visit Summary and PPPS with all of these plans were given to the patient.          Chronic problems are overall stable.    Continue current medications.    Mental status  examination today was a 28 out of 30.  His memory issues could be related to hearing loss.  He is not able to afford hearing aids this time.    Follow-up in 3 months for further evaluation.    Continue routine health maintenance as discussed above.    Continue to follow-up with gastroenterology and cardiology.    Antonio Hatch MD

## 2018-02-06 ENCOUNTER — OFFICE VISIT (OUTPATIENT)
Dept: CARDIOLOGY | Facility: CLINIC | Age: 78
End: 2018-02-06

## 2018-02-06 VITALS
DIASTOLIC BLOOD PRESSURE: 76 MMHG | HEIGHT: 68 IN | BODY MASS INDEX: 34.25 KG/M2 | HEART RATE: 65 BPM | SYSTOLIC BLOOD PRESSURE: 142 MMHG | WEIGHT: 226 LBS

## 2018-02-06 DIAGNOSIS — E78.5 DYSLIPIDEMIA: ICD-10-CM

## 2018-02-06 DIAGNOSIS — I10 ESSENTIAL HYPERTENSION: ICD-10-CM

## 2018-02-06 DIAGNOSIS — I25.118 CORONARY ARTERY DISEASE INVOLVING NATIVE CORONARY ARTERY OF NATIVE HEART WITH OTHER FORM OF ANGINA PECTORIS (HCC): Primary | ICD-10-CM

## 2018-02-06 PROCEDURE — 99214 OFFICE O/P EST MOD 30 MIN: CPT | Performed by: NURSE PRACTITIONER

## 2018-02-06 RX ORDER — AMLODIPINE BESYLATE 10 MG/1
10 TABLET ORAL DAILY
Qty: 30 TABLET | Refills: 11 | Status: SHIPPED | OUTPATIENT
Start: 2018-02-06 | End: 2019-01-01 | Stop reason: SDUPTHER

## 2018-02-06 NOTE — PROGRESS NOTES
Subjective:     Encounter Date:02/06/2018    Primary Care Physician: Antonio Hatch MD      Patient ID: Zach Ramsey is a 77 y.o. male.    Chief Complaint:Aortic valve insufficiency; Atherosclerosis of coronary artery; and Hyperlipidemia    PROBLEM LIST:  1. Coronary artery disease.  a. In 2000, coronary artery bypass grafting.   b. In 2010, cardiac catheterization done at [facility] which showed normal left ventricular function. Three vessel native coronary disease. Patent vein graft to the PDA, patent vein graft to the diagonal and OM branches, and a patent LIMA to the LAD.   c. Echocardiogram, June 2014 which showed mild  to moderate aortic regurgitation. Normal LVEF.   d. Cardiac catheterization, 09/04/2015, revealing patent URIOSTEGUI to LAD, patent saphenous vein graft to the D1, OM1 and OM2, patent saphenous vein graft to the PDA and native right coronary artery with multiple  stenoses and heavily calcified areas that supply relative little left ventricular myocardium, medical management.     2.  Hypertension.   3. Dyslipidemia.   4. Gastroesophageal reflux disease.   5. Arthritis.   6. Nephrolithiasis.   7. Mild esophageal stricture s/p dilatation  8. Remote neck surgery.   9. Right total knee replacement.   10. Tonsillectomy.   11. Remote tobacco use with cessation in 1977.       Allergies   Allergen Reactions   • Isosorbide Nitrate Dizziness   • Metoprolol Nausea Only         Current Outpatient Prescriptions:   •  aspirin 81 MG EC tablet, Take  by mouth daily., Disp: , Rfl:   •  clopidogrel (PLAVIX) 75 MG tablet, TAKE 1 TABLET DAILY, Disp: 90 tablet, Rfl: 3  •  lisinopril-hydrochlorothiazide (PRINZIDE,ZESTORETIC) 20-12.5 MG per tablet, TAKE 1 TABLET DAILY, Disp: 90 tablet, Rfl: 3  •  nitroglycerin (NITROSTAT) 0.4 MG SL tablet, Place 1 tablet under the tongue Every 5 (Five) Minutes As Needed for Chest Pain., Disp: 25 tablet, Rfl: 6  •  pantoprazole (PROTONIX) 40 MG EC tablet, Take 1 tablet by  mouth Daily., Disp: 90 tablet, Rfl: 3  •  pravastatin (PRAVACHOL) 80 MG tablet, Take 1 tablet by mouth Daily., Disp: 90 tablet, Rfl: 4  •  ranolazine (RANEXA) 500 MG 12 hr tablet, Take 1 tablet by mouth 2 (Two) Times a Day. (Patient taking differently: Take 500 mg by mouth Daily.), Disp: 180 tablet, Rfl: 3  •  tiZANidine (ZANAFLEX) 4 MG tablet, Take 1 tablet by mouth Every 6 (Six) Hours As Needed for Muscle Spasms., Disp: 30 tablet, Rfl: 1        History of Present Illness    Patient returns today for 6 month follow-up of coronary artery disease.  Since last being seen he underwent GI evaluation with EGD.  At this time he was noted to have mild esophageal stricture for which she underwent dilatation.  Patient notes that since that time he is still continued to have postprandial anginal symptoms.  However, they do seem to be improving slightly.  He did try off of his Ranexa therapy and did not notice any change in his anginal symptoms.  He went back to this once a day.  He has been previously intolerant to long-acting nitroglycerin as well as metoprolol.  Has not been tried on a calcium channel blocker.  Outside of after eating patient does not note any anginal symptoms.  Notes he is able to walk up stairs without any complaints.  No syncope, near-syncope, or edema.  Has some occasional palpitations but these are chronic and overall unchanged in the last few years.    The following portions of the patient's history were reviewed and updated as appropriate: allergies, current medications, past family history, past medical history, past social history, past surgical history and problem list.    Social History   Substance Use Topics   • Smoking status: Former Smoker     Quit date: 1977   • Smokeless tobacco: Never Used   • Alcohol use No         Review of Systems   Constitution: Negative.   Cardiovascular: Positive for chest pain and dyspnea on exertion (after meals).   Respiratory: Negative.    Hematologic/Lymphatic:  "Negative for bleeding problem. Does not bruise/bleed easily.   Skin: Negative for rash.   Musculoskeletal: Negative for muscle weakness and myalgias.   Gastrointestinal: Negative for heartburn, nausea and vomiting.   Neurological: Negative.           Objective:    height is 172.7 cm (68\") and weight is 103 kg (226 lb). His blood pressure is 142/76 and his pulse is 65.         Physical Exam   Constitutional: He is oriented to person, place, and time. He appears well-developed and well-nourished. No distress.   Neck: No JVD present. No tracheal deviation present.   No bruit auscultated bilaterally   Cardiovascular: Normal rate, regular rhythm and normal heart sounds.  Exam reveals no friction rub.    No murmur heard.  Pulmonary/Chest: Effort normal and breath sounds normal. No respiratory distress.   Abdominal: Soft. Bowel sounds are normal. There is no tenderness.   Musculoskeletal: He exhibits no edema or deformity.   Neurological: He is alert and oriented to person, place, and time.   Skin: Skin is warm and dry.       Procedures          Assessment:   Assessment/Plan      Zach was seen today for aortic valve insufficiency, atherosclerosis of coronary artery and hyperlipidemia.    Diagnoses and all orders for this visit:    Coronary artery disease involving native coronary artery of native heart with other form of angina pectoris.  Symptoms not improved on Ranexa.    Essential hypertension, mildly elevated today.  Was mildly elevated at last visit as well.    Dyslipidemia, on statin therapy.  LDL in October of last year of 81.    Other orders  -     amLODIPine (NORVASC) 10 MG tablet; Take 1 tablet by mouth Daily.      Plan:    At this time as the patient has had no significant change in symptoms with Ranexa we will discontinue this medication.  We'll start amlodipine 10 mg daily for his anginal symptoms as well as for hypertension.  Otherwise, continue current medications.  Discussed with patient if he develops " new anginal symptoms outside of his postprandial episodes he is to follow-up for further evaluation.  We'll see him back in 6 months time or sooner if needed.       Rama GONZALEZ     Dictated utilizing Dragon dictation

## 2018-03-06 RX ORDER — LISINOPRIL AND HYDROCHLOROTHIAZIDE 20; 12.5 MG/1; MG/1
TABLET ORAL
Qty: 90 TABLET | Refills: 3 | Status: SHIPPED | OUTPATIENT
Start: 2018-03-06 | End: 2019-01-01 | Stop reason: SDUPTHER

## 2018-03-06 RX ORDER — CLOPIDOGREL BISULFATE 75 MG/1
TABLET ORAL
Qty: 90 TABLET | Refills: 3 | Status: SHIPPED | OUTPATIENT
Start: 2018-03-06 | End: 2019-01-01 | Stop reason: SDUPTHER

## 2018-04-26 ENCOUNTER — OFFICE VISIT (OUTPATIENT)
Dept: FAMILY MEDICINE CLINIC | Facility: CLINIC | Age: 78
End: 2018-04-26

## 2018-04-26 VITALS
SYSTOLIC BLOOD PRESSURE: 132 MMHG | BODY MASS INDEX: 34.93 KG/M2 | DIASTOLIC BLOOD PRESSURE: 82 MMHG | RESPIRATION RATE: 18 BRPM | TEMPERATURE: 97.9 F | WEIGHT: 230.5 LBS | HEART RATE: 76 BPM | HEIGHT: 68 IN

## 2018-04-26 DIAGNOSIS — I25.118 CORONARY ARTERY DISEASE INVOLVING NATIVE CORONARY ARTERY OF NATIVE HEART WITH OTHER FORM OF ANGINA PECTORIS (HCC): ICD-10-CM

## 2018-04-26 DIAGNOSIS — E78.2 MIXED HYPERLIPIDEMIA: ICD-10-CM

## 2018-04-26 DIAGNOSIS — I10 ESSENTIAL HYPERTENSION: Primary | ICD-10-CM

## 2018-04-26 DIAGNOSIS — D64.9 ANEMIA, UNSPECIFIED TYPE: ICD-10-CM

## 2018-04-26 DIAGNOSIS — M54.2 CERVICAL PAIN (NECK): ICD-10-CM

## 2018-04-26 LAB
ALBUMIN SERPL-MCNC: 4.5 G/DL (ref 3.2–4.8)
ALBUMIN/GLOB SERPL: 2.3 G/DL (ref 1.5–2.5)
ALP SERPL-CCNC: 67 U/L (ref 25–100)
ALT SERPL-CCNC: 17 U/L (ref 7–40)
AST SERPL-CCNC: 13 U/L (ref 0–33)
BASOPHILS # BLD AUTO: 0.02 10*3/MM3 (ref 0–0.2)
BASOPHILS NFR BLD AUTO: 0.3 % (ref 0–1)
BILIRUB SERPL-MCNC: 0.4 MG/DL (ref 0.3–1.2)
BUN SERPL-MCNC: 19 MG/DL (ref 9–23)
BUN/CREAT SERPL: 21.1 (ref 7–25)
CALCIUM SERPL-MCNC: 9 MG/DL (ref 8.7–10.4)
CHLORIDE SERPL-SCNC: 101 MMOL/L (ref 99–109)
CHOLEST SERPL-MCNC: 133 MG/DL (ref 0–200)
CHOLEST/HDLC SERPL: 2.02 {RATIO}
CO2 SERPL-SCNC: 30 MMOL/L (ref 20–31)
CREAT SERPL-MCNC: 0.9 MG/DL (ref 0.6–1.3)
EOSINOPHIL # BLD AUTO: 0.15 10*3/MM3 (ref 0–0.3)
EOSINOPHIL NFR BLD AUTO: 2.5 % (ref 0–3)
ERYTHROCYTE [DISTWIDTH] IN BLOOD BY AUTOMATED COUNT: 13 % (ref 11.3–14.5)
FERRITIN SERPL-MCNC: 58 NG/ML (ref 22–322)
GFR SERPLBLD CREATININE-BSD FMLA CKD-EPI: 82 ML/MIN/1.73
GFR SERPLBLD CREATININE-BSD FMLA CKD-EPI: 99 ML/MIN/1.73
GLOBULIN SER CALC-MCNC: 2 GM/DL
GLUCOSE SERPL-MCNC: 96 MG/DL (ref 70–100)
HCT VFR BLD AUTO: 35.6 % (ref 38.9–50.9)
HDLC SERPL-MCNC: 66 MG/DL (ref 40–60)
HGB BLD-MCNC: 12.2 G/DL (ref 13.1–17.5)
IMM GRANULOCYTES # BLD: 0.02 10*3/MM3 (ref 0–0.03)
IMM GRANULOCYTES NFR BLD: 0.3 % (ref 0–0.6)
IRON SATN MFR SERPL: 27 % (ref 20–50)
IRON SERPL-MCNC: 91 MCG/DL (ref 50–175)
LDLC SERPL CALC-MCNC: 56 MG/DL (ref 0–100)
LYMPHOCYTES # BLD AUTO: 1.28 10*3/MM3 (ref 0.6–4.8)
LYMPHOCYTES NFR BLD AUTO: 21.4 % (ref 24–44)
MCH RBC QN AUTO: 31.3 PG (ref 27–31)
MCHC RBC AUTO-ENTMCNC: 34.3 G/DL (ref 32–36)
MCV RBC AUTO: 91.3 FL (ref 80–99)
MONOCYTES # BLD AUTO: 0.64 10*3/MM3 (ref 0–1)
MONOCYTES NFR BLD AUTO: 10.7 % (ref 0–12)
NEUTROPHILS # BLD AUTO: 3.88 10*3/MM3 (ref 1.5–8.3)
NEUTROPHILS NFR BLD AUTO: 64.8 % (ref 41–71)
PLATELET # BLD AUTO: 207 10*3/MM3 (ref 150–450)
POTASSIUM SERPL-SCNC: 4.2 MMOL/L (ref 3.5–5.5)
PROT SERPL-MCNC: 6.5 G/DL (ref 5.7–8.2)
RBC # BLD AUTO: 3.9 10*6/MM3 (ref 4.2–5.76)
SODIUM SERPL-SCNC: 137 MMOL/L (ref 132–146)
TIBC SERPL-MCNC: 335 MCG/DL (ref 250–450)
TRIGL SERPL-MCNC: 54 MG/DL (ref 0–150)
UIBC SERPL-MCNC: 244 MCG/DL
VLDLC SERPL CALC-MCNC: 10.8 MG/DL
WBC # BLD AUTO: 5.99 10*3/MM3 (ref 3.5–10.8)

## 2018-04-26 PROCEDURE — 99214 OFFICE O/P EST MOD 30 MIN: CPT | Performed by: FAMILY MEDICINE

## 2018-04-26 RX ORDER — TIZANIDINE 4 MG/1
4 TABLET ORAL EVERY 6 HOURS PRN
Qty: 30 TABLET | Refills: 5 | Status: SHIPPED | OUTPATIENT
Start: 2018-04-26 | End: 2019-01-01

## 2018-04-26 NOTE — PROGRESS NOTES
Assessment/Plan       Problems Addressed this Visit        Cardiovascular and Mediastinum    Hyperlipidemia    Relevant Orders    Comprehensive Metabolic Panel    Lipid Panel With / Chol / HDL Ratio    Essential hypertension - Primary    Relevant Orders    Comprehensive Metabolic Panel    Lipid Panel With / Chol / HDL Ratio    Coronary artery disease      Other Visit Diagnoses     Cervical pain (neck)        Relevant Medications    tiZANidine (ZANAFLEX) 4 MG tablet    Anemia, unspecified type        Relevant Orders    CBC & Differential    Iron and TIBC    Ferritin            Follow up: Return if symptoms worsen or fail to improve, for follow up depends on review of labs and testing.     DISCUSSION  Hypertension.  Stable.  Continue medication.  Check labs as noted.    Hyperlipidemia.  Continue medications and check CMP and lipid panel.    Cervical neck pain.  Refilled Zanaflex.  Stable.    Mild anemia.  Check labs and iron levels to determine cause.      MEDICATIONS PRESCRIBED  Requested Prescriptions     Signed Prescriptions Disp Refills   • tiZANidine (ZANAFLEX) 4 MG tablet 30 tablet 5     Sig: Take 1 tablet by mouth Every 6 (Six) Hours As Needed for Muscle Spasms.          -------------------------------------------    Subjective     Chief Complaint   Patient presents with   • Hypertension     3 month f/u, refill tizanidine   • Hyperlipidemia         Hypertension   This is a chronic problem. The current episode started more than 1 year ago. The problem is unchanged. Associated symptoms include chest pain (after eating.) and palpitations (at times). Pertinent negatives include no peripheral edema or shortness of breath. (Has to sit after exertion) Risk factors for coronary artery disease include dyslipidemia. Current antihypertension treatment includes calcium channel blockers, diuretics and ACE inhibitors. Hypertensive end-organ damage includes CAD/MI.   Hyperlipidemia   This is a chronic problem. The current  "episode started more than 1 year ago. The problem is controlled. Recent lipid tests were reviewed and are normal. Associated symptoms include chest pain (after eating.) and myalgias (arms at times). Pertinent negatives include no shortness of breath. Current antihyperlipidemic treatment includes statins. The current treatment provides moderate improvement of lipids. There are no compliance problems.  Risk factors for coronary artery disease include hypertension and dyslipidemia.       Chest pain / CAD  Worse after eating  Has seen Dr Wallis  Had been on Ranexa and then changed to Norvasc  Cath 2015  ECHO 2016    Neck pain   Takes Zanaflex as needed  + helps  P T x 3 weeks and no helps and more pain and stopped   Sedation with meds so tales at night as needed    Mild anemia  Decreased in oct  No blood in BM  No bleeding  + fatigue at times, less energy now, push self to get things done      Past Medical History,Medications, Allergies, and social history was reviewed.      Review of Systems   Constitutional: Negative.    Respiratory: Negative.  Negative for shortness of breath.    Cardiovascular: Positive for chest pain (after eating.) and palpitations (at times).   Gastrointestinal: Negative.    Musculoskeletal: Positive for myalgias (arms at times).   Neurological: Negative.    Psychiatric/Behavioral: Negative.        Objective     Vitals:    04/26/18 1024   BP: 132/82   Pulse: 76   Resp: 18   Temp: 97.9 °F (36.6 °C)   Weight: 105 kg (230 lb 8 oz)   Height: 172.7 cm (68\")          Physical Exam   Constitutional: He is oriented to person, place, and time. Vital signs are normal. He appears well-developed and well-nourished.   HENT:   Head: Normocephalic and atraumatic.   Right Ear: Hearing, tympanic membrane, external ear and ear canal normal.   Left Ear: Hearing, tympanic membrane, external ear and ear canal normal.   Nose: Nose normal.   Mouth/Throat: Oropharynx is clear and moist.   Eyes: Conjunctivae, EOM and " lids are normal. Pupils are equal, round, and reactive to light.   Neck: Normal range of motion. Neck supple. No thyromegaly present.   Cardiovascular: Normal rate and regular rhythm.  Exam reveals no friction rub.    Murmur heard.   Systolic murmur is present with a grade of 2/6   Pulmonary/Chest: Effort normal and breath sounds normal. No respiratory distress. He has no wheezes. He has no rales.   Abdominal: Soft. Normal appearance and bowel sounds are normal. He exhibits no distension and no mass. There is no tenderness. There is no rebound and no guarding.   Musculoskeletal: He exhibits no edema.   Neurological: He is alert and oriented to person, place, and time. He has normal strength.   Skin: Skin is warm and dry.   Psychiatric: He has a normal mood and affect. His speech is normal and behavior is normal. Cognition and memory are normal.   Nursing note and vitals reviewed.              Antonio Hatch MD

## 2018-06-07 ENCOUNTER — OFFICE VISIT (OUTPATIENT)
Dept: FAMILY MEDICINE CLINIC | Facility: CLINIC | Age: 78
End: 2018-06-07

## 2018-06-07 VITALS
WEIGHT: 223 LBS | RESPIRATION RATE: 18 BRPM | TEMPERATURE: 98.4 F | BODY MASS INDEX: 33.8 KG/M2 | SYSTOLIC BLOOD PRESSURE: 122 MMHG | OXYGEN SATURATION: 99 % | HEART RATE: 75 BPM | DIASTOLIC BLOOD PRESSURE: 68 MMHG | HEIGHT: 68 IN

## 2018-06-07 DIAGNOSIS — J06.9 PROTRACTED URI: Primary | ICD-10-CM

## 2018-06-07 PROCEDURE — 99213 OFFICE O/P EST LOW 20 MIN: CPT | Performed by: FAMILY MEDICINE

## 2018-06-07 RX ORDER — AZITHROMYCIN 250 MG/1
TABLET, FILM COATED ORAL
Qty: 6 TABLET | Refills: 0 | Status: SHIPPED | OUTPATIENT
Start: 2018-06-07 | End: 2018-06-21

## 2018-06-07 NOTE — PROGRESS NOTES
Assessment/Plan       Problems Addressed this Visit     None      Visit Diagnoses     Protracted URI    -  Primary    Relevant Medications    azithromycin (ZITHROMAX) 250 MG tablet    HYDROcodone-homatropine (HYCODAN) 5-1.5 MG/5ML syrup            Follow up: Return if symptoms worsen or fail to improve.     DISCUSSION  Start Zpak as noted and Hycodan cough Rx  Side effects explained.   INcrease fluids  Rest    Call next week if not getting better        MEDICATIONS PRESCRIBED  Requested Prescriptions     Signed Prescriptions Disp Refills   • azithromycin (ZITHROMAX) 250 MG tablet 6 tablet 0     Sig: Take 2 tablets the first day, then 1 tablet daily for 4 days.   • HYDROcodone-homatropine (HYCODAN) 5-1.5 MG/5ML syrup 180 mL 0     Sig: Take 5 mL by mouth Every 6 (Six) Hours As Needed for Cough.            Hao dated on 6/7/2018   was reviewed and appropriate.        -------------------------------------------    Subjective     Chief Complaint   Patient presents with   • Cough     not able to rest and coughs and not able to catch breath   • URI     now this is settling in his chest   • Sinusitis     for last week pt has had headache, drainage with white in color with a little green         URI    This is a new problem. The current episode started 1 to 4 weeks ago (x 10 days). The problem has been gradually worsening. There has been no fever. Associated symptoms include chest pain (usually chest pain), congestion, coughing ( white), headaches (at 1st), nausea, sneezing and wheezing (at times). Pertinent negatives include no sore throat or vomiting. Associated symptoms comments: Short of breath at times. Decreased sleep due to cough. Treatments tried: used 18 month old cough med and helped some. The treatment provided mild relief.       Wife had been ill        History   Smoking Status   • Former Smoker   • Quit date: 1977   Smokeless Tobacco   • Never Used        Past Medical History,Medications, Allergies, and  "social history was reviewed.      Review of Systems   Constitutional: Negative.    HENT: Positive for congestion and sneezing. Negative for sore throat.    Respiratory: Positive for cough ( white) and wheezing (at times).    Cardiovascular: Positive for chest pain (usually chest pain).   Gastrointestinal: Positive for nausea. Negative for vomiting.       Objective     Vitals:    06/07/18 1048   BP: 122/68   Pulse: 75   Resp: 18   Temp: 98.4 °F (36.9 °C)   TempSrc: Temporal Artery    SpO2: 99%   Weight: 101 kg (223 lb)   Height: 172.7 cm (67.99\")          Physical Exam   Constitutional: He is oriented to person, place, and time. Vital signs are normal. He appears well-developed and well-nourished.   HENT:   Head: Normocephalic and atraumatic.   Right Ear: Hearing, external ear and ear canal normal. Tympanic membrane is retracted. Tympanic membrane is not erythematous.   Left Ear: Hearing, external ear and ear canal normal. Tympanic membrane is retracted. Tympanic membrane is not erythematous.   Mouth/Throat: Oropharynx is clear and moist.   Eyes: Conjunctivae, EOM and lids are normal. Pupils are equal, round, and reactive to light.   Neck: Normal range of motion. Neck supple. No thyromegaly present.   Cardiovascular: Normal rate, regular rhythm and normal heart sounds.  Exam reveals no friction rub.    No murmur heard.  Pulmonary/Chest: Effort normal. No respiratory distress. He has no wheezes. He has rhonchi. He has no rales.   Abdominal: Normal appearance.   Neurological: He is alert and oriented to person, place, and time. He has normal strength.   Skin: Skin is warm and dry.   Psychiatric: He has a normal mood and affect. His speech is normal. Cognition and memory are normal.   Nursing note and vitals reviewed.                Antonio Hatch MD    "

## 2018-06-19 ENCOUNTER — TELEPHONE (OUTPATIENT)
Dept: FAMILY MEDICINE CLINIC | Facility: CLINIC | Age: 78
End: 2018-06-19

## 2018-06-19 DIAGNOSIS — J06.9 PROTRACTED URI: ICD-10-CM

## 2018-06-19 NOTE — TELEPHONE ENCOUNTER
Spoke with pt he is aware. Can you refill his cough med until he see you Thursday? [place] in [place]!

## 2018-06-19 NOTE — TELEPHONE ENCOUNTER
----- Message from Isamar Pettit sent at 6/19/2018  8:53 AM EDT -----  Contact: GUSTAFSON  PATIENT CALLED ABOUT COMING IN TODAY. HIS COUGH IS NOT ANY BETTER. HE WAS COUGHING WHILE ON THE PHONE TILL HE COULD BARELY TALK HE WAS SO CHOKED UP. THERE WAS NO APPT. TODAY, SO I HAD TO PUT HIM IN ONE OF THE SAME DAY APPT. ON Thursday.   DO YOU THINK THIS IS OK ??

## 2018-06-19 NOTE — TELEPHONE ENCOUNTER
Please call, ok to see on Thursday but if needs to be seen sooner, can see Jose today or tomorrow.

## 2018-06-19 NOTE — TELEPHONE ENCOUNTER
Please call.  I sent prescription for the cough syrup to the [facility] in [place].

## 2018-06-21 ENCOUNTER — OFFICE VISIT (OUTPATIENT)
Dept: FAMILY MEDICINE CLINIC | Facility: CLINIC | Age: 78
End: 2018-06-21

## 2018-06-21 ENCOUNTER — HOSPITAL ENCOUNTER (OUTPATIENT)
Dept: GENERAL RADIOLOGY | Facility: HOSPITAL | Age: 78
Discharge: HOME | End: 2018-06-21
Admitting: FAMILY MEDICINE

## 2018-06-21 VITALS
RESPIRATION RATE: 18 BRPM | TEMPERATURE: 97.3 F | DIASTOLIC BLOOD PRESSURE: 74 MMHG | HEART RATE: 76 BPM | HEIGHT: 68 IN | BODY MASS INDEX: 33.49 KG/M2 | SYSTOLIC BLOOD PRESSURE: 128 MMHG | WEIGHT: 221 LBS

## 2018-06-21 DIAGNOSIS — J06.9 PROTRACTED URI: ICD-10-CM

## 2018-06-21 DIAGNOSIS — R05.9 COUGH: Primary | ICD-10-CM

## 2018-06-21 DIAGNOSIS — J98.01 BRONCHOSPASM: ICD-10-CM

## 2018-06-21 PROCEDURE — 71046 X-RAY EXAM CHEST 2 VIEWS: CPT

## 2018-06-21 PROCEDURE — 99213 OFFICE O/P EST LOW 20 MIN: CPT | Performed by: FAMILY MEDICINE

## 2018-06-21 RX ORDER — PREDNISONE 10 MG/1
TABLET ORAL
Qty: 20 TABLET | Refills: 0 | Status: SHIPPED | OUTPATIENT
Start: 2018-06-21 | End: 2018-08-08

## 2018-06-21 RX ORDER — ALBUTEROL SULFATE 90 UG/1
1-2 AEROSOL, METERED RESPIRATORY (INHALATION) EVERY 4 HOURS PRN
Qty: 18 G | Refills: 0 | Status: SHIPPED | OUTPATIENT
Start: 2018-06-21 | End: 2019-01-01

## 2018-06-21 RX ORDER — DOXYCYCLINE HYCLATE 100 MG/1
100 CAPSULE ORAL 2 TIMES DAILY
Qty: 20 CAPSULE | Refills: 0 | Status: SHIPPED | OUTPATIENT
Start: 2018-06-21 | End: 2018-08-08

## 2018-06-21 NOTE — PROGRESS NOTES
Assessment/Plan       Problems Addressed this Visit     None      Visit Diagnoses     Cough    -  Primary    Relevant Medications    doxycycline (VIBRAMYCIN) 100 MG capsule    albuterol (PROVENTIL HFA;VENTOLIN HFA) 108 (90 Base) MCG/ACT inhaler    predniSONE (DELTASONE) 10 MG tablet    Other Relevant Orders    XR Chest PA & Lateral (Completed)    Protracted URI        Relevant Medications    doxycycline (VIBRAMYCIN) 100 MG capsule    albuterol (PROVENTIL HFA;VENTOLIN HFA) 108 (90 Base) MCG/ACT inhaler    predniSONE (DELTASONE) 10 MG tablet    Other Relevant Orders    XR Chest PA & Lateral (Completed)    Bronchospasm        Relevant Medications    albuterol (PROVENTIL HFA;VENTOLIN HFA) 108 (90 Base) MCG/ACT inhaler    predniSONE (DELTASONE) 10 MG tablet            Follow up: Return if symptoms worsen or fail to improve.     DISCUSSION  Persistent cough.  Recommend check x-ray.  Change to doxycycline.  Add albuterol and prednisone.  Call if not improving.  We will notify of x-ray results when available.    He is considering referral to [facility] for evaluation of chronic chest pain.  He will let me know when he would like to have that done.    MEDICATIONS PRESCRIBED  Requested Prescriptions     Signed Prescriptions Disp Refills   • doxycycline (VIBRAMYCIN) 100 MG capsule 20 capsule 0     Sig: Take 1 capsule by mouth 2 (Two) Times a Day.   • albuterol (PROVENTIL HFA;VENTOLIN HFA) 108 (90 Base) MCG/ACT inhaler 18 g 0     Sig: Inhale 1-2 puffs Every 4 (Four) Hours As Needed for Wheezing.   • predniSONE (DELTASONE) 10 MG tablet 20 tablet 0     Si po x 2 days then 3 po daily x 2 days then 2 po daily x 2 days then 1 po daily x 2 days then stop.            -------------------------------------------    Subjective     Chief Complaint   Patient presents with   • Follow-up     Cough no better         Cough   This is a recurrent problem. The current episode started 1 to 4 weeks ago. The problem has been unchanged  "(got some better and then came back ). The problem occurs hourly (gets it in spells). The cough is productive of sputum (white at times). Associated symptoms include shortness of breath and wheezing (last night). Pertinent negatives include no fever. Associated symptoms comments: Decreased sleep at times, no chest pain . Nothing aggravates the symptoms. His past medical history is significant for asthma (long ago + told has asthma, had inhlaler many years ago). There is no history of COPD.       Had seen Dr Wallis for cardiology and wants a second opinion  He will call when he is ready for that  Considering [facility]  Chest pain after eating still  No heart racing  Had EGD and had esophagus stretched slightly  Worries about this everyday      History   Smoking Status   • Former Smoker   • Quit date: 1977   Smokeless Tobacco   • Never Used        Past Medical History,Medications, Allergies, and social history was reviewed.      Review of Systems   Constitutional: Negative.  Negative for fever.   HENT: Negative.    Respiratory: Positive for cough, shortness of breath and wheezing (last night).    Cardiovascular: Negative.    Gastrointestinal: Negative.    Psychiatric/Behavioral: Negative.        Objective     Vitals:    06/21/18 1044   BP: 128/74   Pulse: 76   Resp: 18   Temp: 97.3 °F (36.3 °C)   Weight: 100 kg (221 lb)   Height: 172.7 cm (68\")          Physical Exam   Constitutional: He is oriented to person, place, and time. Vital signs are normal. He appears well-developed and well-nourished.   HENT:   Head: Normocephalic and atraumatic.   Right Ear: Hearing, tympanic membrane, external ear and ear canal normal.   Left Ear: Hearing, tympanic membrane, external ear and ear canal normal.   Mouth/Throat: Oropharynx is clear and moist.   Eyes: Conjunctivae, EOM and lids are normal. Pupils are equal, round, and reactive to light.   Neck: Normal range of motion. Neck supple. No thyromegaly present. "   Cardiovascular: Normal rate, regular rhythm and normal heart sounds.  Exam reveals no friction rub.    No murmur heard.  Pulmonary/Chest: Effort normal. No respiratory distress. He has decreased breath sounds. He has no wheezes. He has rhonchi. He has rales ( faint left lateral).   Abdominal: Normal appearance.   Musculoskeletal: He exhibits no edema.   Neurological: He is alert and oriented to person, place, and time. He has normal strength.   Skin: Skin is warm and dry.   Psychiatric: He has a normal mood and affect. His speech is normal and behavior is normal. Cognition and memory are normal.   Nursing note and vitals reviewed.                Antonio Hatch MD

## 2018-08-08 ENCOUNTER — OFFICE VISIT (OUTPATIENT)
Dept: CARDIOLOGY | Facility: CLINIC | Age: 78
End: 2018-08-08

## 2018-08-08 VITALS
DIASTOLIC BLOOD PRESSURE: 68 MMHG | HEIGHT: 68 IN | SYSTOLIC BLOOD PRESSURE: 140 MMHG | WEIGHT: 227 LBS | BODY MASS INDEX: 34.4 KG/M2 | HEART RATE: 78 BPM | RESPIRATION RATE: 18 BRPM

## 2018-08-08 DIAGNOSIS — I25.118 CORONARY ARTERY DISEASE INVOLVING NATIVE CORONARY ARTERY OF NATIVE HEART WITH OTHER FORM OF ANGINA PECTORIS (HCC): Primary | ICD-10-CM

## 2018-08-08 DIAGNOSIS — E78.5 DYSLIPIDEMIA: ICD-10-CM

## 2018-08-08 DIAGNOSIS — I10 ESSENTIAL HYPERTENSION: ICD-10-CM

## 2018-08-08 PROCEDURE — 99214 OFFICE O/P EST MOD 30 MIN: CPT | Performed by: INTERNAL MEDICINE

## 2018-08-08 RX ORDER — NITROGLYCERIN 0.4 MG/1
0.4 TABLET SUBLINGUAL
Qty: 25 TABLET | Refills: 6 | Status: SHIPPED | OUTPATIENT
Start: 2018-08-08

## 2018-08-08 NOTE — PROGRESS NOTES
Subjective:     Encounter Date:08/08/2018      Patient ID: Zach Ramsey is a 78 y.o. male.    Chief Complaint: Follow-up      PROBLEM LIST:  1. Coronary artery disease.  a. In 2000, coronary artery bypass grafting.   b. In 2010, cardiac catheterization done at [facility] which showed normal left ventricular function. Three vessel native coronary disease. Patent vein graft to the PDA, patent vein graft to the diagonal and OM branches, and a patent LIMA to the LAD.   c. Echocardiogram, June 2014 which showed mild  to moderate aortic regurgitation. Normal LVEF.   d. Cardiac catheterization, 09/04/2015, revealing patent URIOSTEGUI to LAD, patent saphenous vein graft to the D1, OM1 and OM2, patent saphenous vein graft to the PDA and native right coronary artery with multiple  stenoses and heavily calcified areas that supply relative little left ventricular myocardium, medical management.     2.  Hypertension.   3. Dyslipidemia.   4. Gastroesophageal reflux disease.   5. Arthritis.   6. Nephrolithiasis.   7. Mild esophageal stricture s/p dilatation  8. Remote neck surgery.   9. Right total knee replacement.   10. Tonsillectomy.   11. Remote tobacco use with cessation in 1977.     History of Present Illness  Patient returns today for follow up with a history of Coronary artery disease.  Since her last visit he is essentially unchanged.  He still has functional class II angina that predominantly occurs postprandial.  1 he is active not associated with meals he has essentially no symptoms.  No orthopnea PND dyspnea presyncope syncope.  Continued unchanged palpitations..     Allergies   Allergen Reactions   • Isosorbide Nitrate Dizziness   • Metoprolol Nausea Only         Current Outpatient Prescriptions:   •  aspirin 81 MG EC tablet, Take  by mouth daily., Disp: , Rfl:   •  clopidogrel (PLAVIX) 75 MG tablet, TAKE 1 TABLET DAILY, Disp: 90 tablet, Rfl: 3  •  lisinopril-hydrochlorothiazide (PRINZIDE,ZESTORETIC)  "20-12.5 MG per tablet, TAKE 1 TABLET DAILY, Disp: 90 tablet, Rfl: 3  •  nitroglycerin (NITROSTAT) 0.4 MG SL tablet, Place 1 tablet under the tongue Every 5 (Five) Minutes As Needed for Chest Pain., Disp: 25 tablet, Rfl: 6  •  pantoprazole (PROTONIX) 40 MG EC tablet, Take 1 tablet by mouth Daily., Disp: 90 tablet, Rfl: 3  •  pravastatin (PRAVACHOL) 80 MG tablet, Take 1 tablet by mouth Daily., Disp: 90 tablet, Rfl: 4  •  tiZANidine (ZANAFLEX) 4 MG tablet, Take 1 tablet by mouth Every 6 (Six) Hours As Needed for Muscle Spasms., Disp: 30 tablet, Rfl: 5  •  albuterol (PROVENTIL HFA;VENTOLIN HFA) 108 (90 Base) MCG/ACT inhaler, Inhale 1-2 puffs Every 4 (Four) Hours As Needed for Wheezing., Disp: 18 g, Rfl: 0  •  amLODIPine (NORVASC) 10 MG tablet, Take 1 tablet by mouth Daily., Disp: 30 tablet, Rfl: 11  •  HYDROcodone-homatropine (HYCODAN) 5-1.5 MG/5ML syrup, Take 5 mL by mouth Every 6 (Six) Hours As Needed for Cough., Disp: 180 mL, Rfl: 0    The following portions of the patient's history were reviewed and updated as appropriate: allergies, current medications, past family history, past medical history, past social history, past surgical history and problem list.    Review of Systems   HENT: Positive for tinnitus.    Cardiovascular: Positive for irregular heartbeat and palpitations.   Hematologic/Lymphatic: Bruises/bleeds easily.   Musculoskeletal: Positive for arthritis, myalgias and neck pain.          Objective:   Blood pressure 140/68, pulse 78, resp. rate 18, height 172.7 cm (68\"), weight 103 kg (227 lb).      Physical Exam   Constitutional: He is oriented to person, place, and time. He appears well-developed and well-nourished.   HENT:   Mouth/Throat: Oropharynx is clear and moist.   Neck: No JVD present. Carotid bruit is not present. No thyromegaly present.   Cardiovascular: Regular rhythm, S1 normal, S2 normal and intact distal pulses.  Exam reveals no gallop, no S3 and no S4.    Murmur heard.   Systolic murmur is " present with a grade of 3/6  at the upper left sternal border, lower left sternal border  Pulses:       Carotid pulses are 2+ on the right side, and 2+ on the left side.       Radial pulses are 2+ on the right side, and 2+ on the left side.   Pulmonary/Chest: Breath sounds normal.   Abdominal: Soft. Bowel sounds are normal. He exhibits no mass. There is no tenderness.   Musculoskeletal: He exhibits no edema.   Neurological: He is alert and oriented to person, place, and time.   Skin: Skin is warm and dry. No rash noted.       Lab Review:    Procedures        Assessment:   Zach was seen today for follow-up.    Diagnoses and all orders for this visit:    Coronary artery disease involving native coronary artery of native heart with other form of angina pectoris (CMS/HCC)    Dyslipidemia    Essential hypertension        Impression  1. Coronary artery disease, unchanged, chronic functional class II postprandial angina  2. Hypertension controlled  3. Dyslipidemia on high-dose tolerated statin dose.,  Last LDL 56    Plan:  1. Continue current medical therapy  2. Revisit in 12 MO, or sooner as needed.    Lucas Wallis MD

## 2018-11-06 NOTE — PROGRESS NOTES
Assessment/Plan       Problems Addressed this Visit        Cardiovascular and Mediastinum    Essential hypertension    Coronary artery disease    Relevant Orders    Comprehensive Metabolic Panel    Lipid Panel With / Chol / HDL Ratio      Other Visit Diagnoses     Nocturia    -  Primary    Relevant Orders    PSA DIAGNOSTIC    Comprehensive Metabolic Panel    Strains to urinate        Relevant Orders    PSA DIAGNOSTIC    Anemia, unspecified type        Relevant Orders    CBC & Differential    Prostate cancer screening        Relevant Orders    PSA DIAGNOSTIC    Tinnitus of right ear        Need for influenza vaccination        Relevant Orders    Fluzone High Dose =>65Years (Completed)            Follow up: Return if symptoms worsen or fail to improve, for follow up depends on review of labs and testing.     DISCUSSION  Worsening issues with nocturia and straining to urinate.  Check PSA.  If normal, consider medication to help with this.    History of anemia.  Recheck CBC.    Coronary artery disease with history of hypertension.  Stable.  Continue medications and check labs.    Tinnitus of right ear.  He does not wish further evaluation at this time and will call if worsens.    Flu shot today.  Vaccine information statement given.    Per prescription in for hepatitis A vaccine given.      MEDICATIONS PRESCRIBED  Requested Prescriptions      No prescriptions requested or ordered in this encounter              -------------------------------------------    Subjective     Chief Complaint   Patient presents with   • Hypertension   • Labs Only     prostate check?   • Immunizations     flu shot, hep A, not sure about pneumonia.         Hypertension   This is a chronic problem. The current episode started more than 1 year ago. The problem is unchanged. Associated symptoms include chest pain (after eating.) and palpitations (at times). Pertinent negatives include no peripheral edema or shortness of breath. (Has to sit after  exertion) Risk factors for coronary artery disease include dyslipidemia. Current antihypertension treatment includes calcium channel blockers, diuretics and ACE inhibitors. Hypertensive end-organ damage includes CAD/MI.   Coronary Artery Disease   Symptoms include chest pain (after eating.) and palpitations (at times). Pertinent negatives include no shortness of breath. Risk factors include hyperlipidemia.   Hyperlipidemia   This is a chronic problem. The current episode started more than 1 year ago. The problem is controlled. Recent lipid tests were reviewed and are normal. Associated symptoms include chest pain (after eating.) and myalgias (arms at times). Pertinent negatives include no shortness of breath. Current antihyperlipidemic treatment includes statins. The current treatment provides moderate improvement of lipids. There are no compliance problems.  Risk factors for coronary artery disease include hypertension and dyslipidemia.     Nocturia  Prostate pressure  Up 6 times the other night  Occurs most nights now  straining to urinate  Flow is decreased      Tinnitus  Having ringing in the right ear.  Has been going on for a while.  Gets worse at times but then is better.  Does do a lot of woodworking and around towards a lot.  He does not wish to see an ENT at this time.      History   Smoking Status   • Former Smoker   • Quit date: 1977   Smokeless Tobacco   • Never Used        Past Medical History,Medications, Allergies, and social history was reviewed.      Review of Systems   Constitutional: Negative.    HENT: Positive for tinnitus. Negative for hearing loss.    Respiratory: Negative.  Negative for shortness of breath.    Cardiovascular: Positive for chest pain (after eating.) and palpitations (at times).   Gastrointestinal: Negative.    Genitourinary: Positive for difficulty urinating and nocturia. Negative for dysuria and hematuria.   Musculoskeletal: Positive for myalgias (arms at times).  "  Neurological: Negative.    Psychiatric/Behavioral: Negative.        Objective     Vitals:    11/06/18 1123   BP: 122/72   Pulse: 76   Resp: 18   Temp: 97.6 °F (36.4 °C)   Weight: 103 kg (227 lb 8 oz)   Height: 172.7 cm (68\")          Physical Exam   Constitutional: Vital signs are normal. He appears well-developed and well-nourished.   HENT:   Head: Normocephalic and atraumatic.   Right Ear: Hearing, tympanic membrane, external ear and ear canal normal.   Left Ear: Hearing, tympanic membrane, external ear and ear canal normal.   Mouth/Throat: Oropharynx is clear and moist.   Eyes: Pupils are equal, round, and reactive to light. Conjunctivae, EOM and lids are normal.   Neck: Normal range of motion. Neck supple. No thyromegaly present.   Cardiovascular: Normal rate, regular rhythm and normal heart sounds.  Exam reveals no friction rub.    No murmur heard.  Pulmonary/Chest: Effort normal and breath sounds normal. No respiratory distress. He has no wheezes. He has no rales.   Abdominal: Soft. Normal appearance and bowel sounds are normal. He exhibits no distension and no mass. There is no tenderness. There is no rebound and no guarding.   Musculoskeletal: He exhibits no edema.   Neurological: He is alert. He has normal strength.   Skin: Skin is warm and dry.   Psychiatric: He has a normal mood and affect. His speech is normal. Cognition and memory are normal.   Nursing note and vitals reviewed.                Antonio Hatch MD    "

## 2019-01-01 ENCOUNTER — TELEPHONE (OUTPATIENT)
Dept: FAMILY MEDICINE CLINIC | Facility: CLINIC | Age: 79
End: 2019-01-01

## 2019-01-01 ENCOUNTER — APPOINTMENT (OUTPATIENT)
Dept: GENERAL RADIOLOGY | Facility: HOSPITAL | Age: 79
End: 2019-01-01

## 2019-01-01 ENCOUNTER — OFFICE VISIT (OUTPATIENT)
Dept: FAMILY MEDICINE CLINIC | Facility: CLINIC | Age: 79
End: 2019-01-01

## 2019-01-01 ENCOUNTER — DOCUMENTATION (OUTPATIENT)
Dept: FAMILY MEDICINE CLINIC | Facility: CLINIC | Age: 79
End: 2019-01-01

## 2019-01-01 ENCOUNTER — TELEPHONE (OUTPATIENT)
Dept: CARDIOLOGY | Facility: CLINIC | Age: 79
End: 2019-01-01

## 2019-01-01 ENCOUNTER — PREP FOR SURGERY (OUTPATIENT)
Dept: OTHER | Facility: HOSPITAL | Age: 79
End: 2019-01-01

## 2019-01-01 ENCOUNTER — OFFICE VISIT (OUTPATIENT)
Dept: CARDIOLOGY | Facility: CLINIC | Age: 79
End: 2019-01-01

## 2019-01-01 ENCOUNTER — RESULTS ENCOUNTER (OUTPATIENT)
Dept: FAMILY MEDICINE CLINIC | Facility: CLINIC | Age: 79
End: 2019-01-01

## 2019-01-01 ENCOUNTER — HOSPITAL ENCOUNTER (OUTPATIENT)
Dept: CARDIOLOGY | Facility: HOSPITAL | Age: 79
Discharge: HOME | End: 2019-06-21
Admitting: INTERNAL MEDICINE

## 2019-01-01 ENCOUNTER — HOSPITAL ENCOUNTER (OUTPATIENT)
Facility: HOSPITAL | Age: 79
Setting detail: HOSPITAL OUTPATIENT SURGERY
Discharge: HOME | End: 2019-07-09
Attending: INTERNAL MEDICINE | Admitting: INTERNAL MEDICINE

## 2019-01-01 VITALS
OXYGEN SATURATION: 98 % | BODY MASS INDEX: 32.43 KG/M2 | HEIGHT: 68 IN | DIASTOLIC BLOOD PRESSURE: 62 MMHG | WEIGHT: 214 LBS | SYSTOLIC BLOOD PRESSURE: 130 MMHG | HEART RATE: 72 BPM

## 2019-01-01 VITALS
SYSTOLIC BLOOD PRESSURE: 103 MMHG | OXYGEN SATURATION: 95 % | HEIGHT: 68 IN | WEIGHT: 203.04 LBS | BODY MASS INDEX: 30.77 KG/M2 | DIASTOLIC BLOOD PRESSURE: 62 MMHG | TEMPERATURE: 97.2 F | HEART RATE: 66 BPM | RESPIRATION RATE: 16 BRPM

## 2019-01-01 VITALS
WEIGHT: 215.5 LBS | OXYGEN SATURATION: 95 % | BODY MASS INDEX: 32.66 KG/M2 | HEIGHT: 68 IN | TEMPERATURE: 98 F | DIASTOLIC BLOOD PRESSURE: 64 MMHG | RESPIRATION RATE: 18 BRPM | HEART RATE: 115 BPM | SYSTOLIC BLOOD PRESSURE: 136 MMHG

## 2019-01-01 VITALS
WEIGHT: 210 LBS | BODY MASS INDEX: 31.83 KG/M2 | HEART RATE: 68 BPM | RESPIRATION RATE: 20 BRPM | DIASTOLIC BLOOD PRESSURE: 60 MMHG | SYSTOLIC BLOOD PRESSURE: 110 MMHG | HEIGHT: 68 IN | TEMPERATURE: 97.4 F

## 2019-01-01 VITALS
HEART RATE: 63 BPM | DIASTOLIC BLOOD PRESSURE: 70 MMHG | RESPIRATION RATE: 20 BRPM | SYSTOLIC BLOOD PRESSURE: 142 MMHG | TEMPERATURE: 97.5 F | OXYGEN SATURATION: 96 % | WEIGHT: 231.5 LBS | BODY MASS INDEX: 35.09 KG/M2 | HEIGHT: 68 IN

## 2019-01-01 VITALS
DIASTOLIC BLOOD PRESSURE: 78 MMHG | TEMPERATURE: 97.2 F | BODY MASS INDEX: 32.13 KG/M2 | SYSTOLIC BLOOD PRESSURE: 118 MMHG | WEIGHT: 212 LBS | HEART RATE: 78 BPM | RESPIRATION RATE: 18 BRPM | HEIGHT: 68 IN

## 2019-01-01 VITALS
RESPIRATION RATE: 18 BRPM | TEMPERATURE: 98.5 F | WEIGHT: 200 LBS | HEIGHT: 68 IN | BODY MASS INDEX: 30.31 KG/M2 | HEART RATE: 70 BPM | DIASTOLIC BLOOD PRESSURE: 56 MMHG | SYSTOLIC BLOOD PRESSURE: 98 MMHG

## 2019-01-01 VITALS — BODY MASS INDEX: 32.43 KG/M2 | HEIGHT: 68 IN | WEIGHT: 214 LBS

## 2019-01-01 VITALS
SYSTOLIC BLOOD PRESSURE: 144 MMHG | DIASTOLIC BLOOD PRESSURE: 74 MMHG | OXYGEN SATURATION: 95 % | HEART RATE: 94 BPM | BODY MASS INDEX: 33.15 KG/M2 | TEMPERATURE: 99.6 F | RESPIRATION RATE: 20 BRPM | WEIGHT: 218 LBS

## 2019-01-01 DIAGNOSIS — R93.1 ABNORMAL ECHOCARDIOGRAM: Primary | ICD-10-CM

## 2019-01-01 DIAGNOSIS — I25.118 CORONARY ARTERY DISEASE INVOLVING NATIVE CORONARY ARTERY OF NATIVE HEART WITH OTHER FORM OF ANGINA PECTORIS (HCC): Primary | ICD-10-CM

## 2019-01-01 DIAGNOSIS — I10 ESSENTIAL HYPERTENSION: ICD-10-CM

## 2019-01-01 DIAGNOSIS — I50.9 ACUTE CONGESTIVE HEART FAILURE, UNSPECIFIED HEART FAILURE TYPE (HCC): ICD-10-CM

## 2019-01-01 DIAGNOSIS — R05.9 COUGH: ICD-10-CM

## 2019-01-01 DIAGNOSIS — R93.1 ABNORMAL ECHOCARDIOGRAM: ICD-10-CM

## 2019-01-01 DIAGNOSIS — I95.89 HYPOTENSION DUE TO HYPOVOLEMIA: ICD-10-CM

## 2019-01-01 DIAGNOSIS — N28.9 RENAL INSUFFICIENCY: ICD-10-CM

## 2019-01-01 DIAGNOSIS — I50.22 CHRONIC SYSTOLIC CONGESTIVE HEART FAILURE (HCC): ICD-10-CM

## 2019-01-01 DIAGNOSIS — Z01.818 PREOP EXAMINATION: ICD-10-CM

## 2019-01-01 DIAGNOSIS — M54.2 NECK PAIN: ICD-10-CM

## 2019-01-01 DIAGNOSIS — I25.118 CORONARY ARTERY DISEASE INVOLVING NATIVE CORONARY ARTERY OF NATIVE HEART WITH OTHER FORM OF ANGINA PECTORIS (HCC): ICD-10-CM

## 2019-01-01 DIAGNOSIS — M54.2 NECK PAIN: Primary | ICD-10-CM

## 2019-01-01 DIAGNOSIS — H25.9 AGE-RELATED CATARACT OF BOTH EYES, UNSPECIFIED AGE-RELATED CATARACT TYPE: Primary | ICD-10-CM

## 2019-01-01 DIAGNOSIS — I48.91 ATRIAL FIBRILLATION, UNSPECIFIED TYPE (HCC): ICD-10-CM

## 2019-01-01 DIAGNOSIS — E78.2 MIXED HYPERLIPIDEMIA: ICD-10-CM

## 2019-01-01 DIAGNOSIS — J06.9 PROTRACTED URI: ICD-10-CM

## 2019-01-01 DIAGNOSIS — I48.19 PERSISTENT ATRIAL FIBRILLATION (HCC): ICD-10-CM

## 2019-01-01 DIAGNOSIS — N28.9 RENAL INSUFFICIENCY: Primary | ICD-10-CM

## 2019-01-01 DIAGNOSIS — I48.91 ATRIAL FIBRILLATION, UNSPECIFIED TYPE (HCC): Primary | ICD-10-CM

## 2019-01-01 DIAGNOSIS — E78.5 DYSLIPIDEMIA: ICD-10-CM

## 2019-01-01 DIAGNOSIS — R52 BODY ACHES: ICD-10-CM

## 2019-01-01 DIAGNOSIS — Z23 NEED FOR SHINGLES VACCINE: ICD-10-CM

## 2019-01-01 DIAGNOSIS — E86.1 HYPOTENSION DUE TO HYPOVOLEMIA: ICD-10-CM

## 2019-01-01 DIAGNOSIS — R06.02 SHORTNESS OF BREATH: ICD-10-CM

## 2019-01-01 DIAGNOSIS — J06.9 UPPER RESPIRATORY TRACT INFECTION, UNSPECIFIED TYPE: ICD-10-CM

## 2019-01-01 DIAGNOSIS — R68.89 FLU-LIKE SYMPTOMS: Primary | ICD-10-CM

## 2019-01-01 DIAGNOSIS — Z00.00 MEDICARE ANNUAL WELLNESS VISIT, SUBSEQUENT: Primary | ICD-10-CM

## 2019-01-01 LAB
ALBUMIN SERPL-MCNC: 4.4 G/DL (ref 3.5–5.2)
ALBUMIN SERPL-MCNC: 4.54 G/DL (ref 3.2–4.8)
ALBUMIN SERPL-MCNC: 4.6 G/DL (ref 3.5–5.2)
ALBUMIN/GLOB SERPL: 1.8 G/DL
ALBUMIN/GLOB SERPL: 1.9 G/DL
ALBUMIN/GLOB SERPL: 2.2 G/DL (ref 1.5–2.5)
ALP SERPL-CCNC: 61 U/L (ref 39–117)
ALP SERPL-CCNC: 67 U/L (ref 25–100)
ALP SERPL-CCNC: 72 U/L (ref 39–117)
ALT SERPL W P-5'-P-CCNC: 13 U/L (ref 1–41)
ALT SERPL-CCNC: 11 U/L (ref 1–41)
ALT SERPL-CCNC: 23 U/L (ref 7–40)
ANION GAP SERPL CALCULATED.3IONS-SCNC: 15 MMOL/L (ref 5–15)
AST SERPL-CCNC: 12 U/L (ref 1–40)
AST SERPL-CCNC: 14 U/L (ref 1–40)
AST SERPL-CCNC: 24 U/L (ref 0–33)
BASOPHILS # BLD AUTO: 0.03 10*3/MM3 (ref 0–0.2)
BASOPHILS # BLD AUTO: 0.04 10*3/MM3 (ref 0–0.2)
BASOPHILS NFR BLD AUTO: 0.4 % (ref 0–1)
BASOPHILS NFR BLD AUTO: 0.4 % (ref 0–1.5)
BH CV ECHO MEAS - AI DEC SLOPE: 208.5 CM/SEC^2
BH CV ECHO MEAS - AI END-D VEL: 241.9 CM/SEC
BH CV ECHO MEAS - AI MAX PG: 42.5 MMHG
BH CV ECHO MEAS - AI MAX VEL: 325.8 CM/SEC
BH CV ECHO MEAS - AI P1/2T: 457.7 MSEC
BH CV ECHO MEAS - AO MAX PG (FULL): 11.5 MMHG
BH CV ECHO MEAS - AO MAX PG: 19 MMHG
BH CV ECHO MEAS - AO MEAN PG (FULL): 8.1 MMHG
BH CV ECHO MEAS - AO MEAN PG: 12 MMHG
BH CV ECHO MEAS - AO ROOT AREA (BSA CORRECTED): 1.4
BH CV ECHO MEAS - AO ROOT AREA: 7 CM^2
BH CV ECHO MEAS - AO ROOT DIAM: 3 CM
BH CV ECHO MEAS - AO V2 MAX: 218.5 CM/SEC
BH CV ECHO MEAS - AO V2 MEAN: 165.4 CM/SEC
BH CV ECHO MEAS - AO V2 VTI: 47.9 CM
BH CV ECHO MEAS - AVA(I,A): 2.5 CM^2
BH CV ECHO MEAS - AVA(I,D): 2.5 CM^2
BH CV ECHO MEAS - AVA(V,A): 2.8 CM^2
BH CV ECHO MEAS - AVA(V,D): 2.8 CM^2
BH CV ECHO MEAS - BSA(HAYCOCK): 2.2 M^2
BH CV ECHO MEAS - BSA: 2.1 M^2
BH CV ECHO MEAS - BZI_BMI: 32.5 KILOGRAMS/M^2
BH CV ECHO MEAS - BZI_METRIC_HEIGHT: 172.7 CM
BH CV ECHO MEAS - BZI_METRIC_WEIGHT: 97.1 KG
BH CV ECHO MEAS - EDV(CUBED): 110.9 ML
BH CV ECHO MEAS - EDV(MOD-SP2): 168 ML
BH CV ECHO MEAS - EDV(MOD-SP4): 197 ML
BH CV ECHO MEAS - EDV(TEICH): 107.8 ML
BH CV ECHO MEAS - EF(CUBED): 30.8 %
BH CV ECHO MEAS - EF(MOD-BP): 40 %
BH CV ECHO MEAS - EF(MOD-SP2): 42.9 %
BH CV ECHO MEAS - EF(MOD-SP4): 41.1 %
BH CV ECHO MEAS - EF(TEICH): 25 %
BH CV ECHO MEAS - ESV(CUBED): 76.8 ML
BH CV ECHO MEAS - ESV(MOD-SP2): 96 ML
BH CV ECHO MEAS - ESV(MOD-SP4): 116 ML
BH CV ECHO MEAS - ESV(TEICH): 80.8 ML
BH CV ECHO MEAS - FS: 11.5 %
BH CV ECHO MEAS - IVS/LVPW: 0.99
BH CV ECHO MEAS - IVSD: 1.1 CM
BH CV ECHO MEAS - LAD MAJOR: 6.4 CM
BH CV ECHO MEAS - LAT PEAK E' VEL: 12.1 CM/SEC
BH CV ECHO MEAS - LATERAL E/E' RATIO: 8.8
BH CV ECHO MEAS - LV DIASTOLIC VOL/BSA (35-75): 93.7 ML/M^2
BH CV ECHO MEAS - LV MASS(C)D: 192.7 GRAMS
BH CV ECHO MEAS - LV MASS(C)DI: 91.6 GRAMS/M^2
BH CV ECHO MEAS - LV MAX PG: 7.5 MMHG
BH CV ECHO MEAS - LV MEAN PG: 3.9 MMHG
BH CV ECHO MEAS - LV SYSTOLIC VOL/BSA (12-30): 55.1 ML/M^2
BH CV ECHO MEAS - LV V1 MAX: 135.6 CM/SEC
BH CV ECHO MEAS - LV V1 MEAN: 89.4 CM/SEC
BH CV ECHO MEAS - LV V1 VTI: 26.5 CM
BH CV ECHO MEAS - LVIDD: 4.8 CM
BH CV ECHO MEAS - LVIDS: 4.3 CM
BH CV ECHO MEAS - LVLD AP2: 8.6 CM
BH CV ECHO MEAS - LVLD AP4: 8.2 CM
BH CV ECHO MEAS - LVLS AP2: 7.2 CM
BH CV ECHO MEAS - LVLS AP4: 8 CM
BH CV ECHO MEAS - LVOT AREA (M): 4.5 CM^2
BH CV ECHO MEAS - LVOT AREA: 4.6 CM^2
BH CV ECHO MEAS - LVOT DIAM: 2.4 CM
BH CV ECHO MEAS - LVPWD: 1.1 CM
BH CV ECHO MEAS - MED PEAK E' VEL: 6.3 CM/SEC
BH CV ECHO MEAS - MEDIAL E/E' RATIO: 16.6
BH CV ECHO MEAS - MV DEC SLOPE: 327 CM/SEC^2
BH CV ECHO MEAS - MV DEC TIME: 0.18 SEC
BH CV ECHO MEAS - MV E MAX VEL: 107.6 CM/SEC
BH CV ECHO MEAS - MV MAX PG: 4.7 MMHG
BH CV ECHO MEAS - MV MEAN PG: 1.9 MMHG
BH CV ECHO MEAS - MV P1/2T MAX VEL: 117.6 CM/SEC
BH CV ECHO MEAS - MV P1/2T: 105.4 MSEC
BH CV ECHO MEAS - MV V2 MAX: 108 CM/SEC
BH CV ECHO MEAS - MV V2 MEAN: 62.9 CM/SEC
BH CV ECHO MEAS - MV V2 VTI: 27.7 CM
BH CV ECHO MEAS - MVA P1/2T LCG: 1.9 CM^2
BH CV ECHO MEAS - MVA(P1/2T): 2.1 CM^2
BH CV ECHO MEAS - MVA(VTI): 4.4 CM^2
BH CV ECHO MEAS - PA ACC SLOPE: 506.5 CM/SEC^2
BH CV ECHO MEAS - PA ACC TIME: 0.11 SEC
BH CV ECHO MEAS - PA PR(ACCEL): 29.9 MMHG
BH CV ECHO MEAS - PI END-D VEL: 118.1 CM/SEC
BH CV ECHO MEAS - RAP SYSTOLE: 15 MMHG
BH CV ECHO MEAS - RVSP: 31 MMHG
BH CV ECHO MEAS - SI(AO): 159.2 ML/M^2
BH CV ECHO MEAS - SI(CUBED): 16.2 ML/M^2
BH CV ECHO MEAS - SI(LVOT): 57.7 ML/M^2
BH CV ECHO MEAS - SI(MOD-SP2): 34.2 ML/M^2
BH CV ECHO MEAS - SI(MOD-SP4): 38.5 ML/M^2
BH CV ECHO MEAS - SI(TEICH): 12.8 ML/M^2
BH CV ECHO MEAS - SV(AO): 335 ML
BH CV ECHO MEAS - SV(CUBED): 34.2 ML
BH CV ECHO MEAS - SV(LVOT): 121.5 ML
BH CV ECHO MEAS - SV(MOD-SP2): 72 ML
BH CV ECHO MEAS - SV(MOD-SP4): 81 ML
BH CV ECHO MEAS - SV(TEICH): 27 ML
BH CV ECHO MEAS - TAPSE (>1.6): 1.7 CM2
BH CV ECHO MEAS - TR MAX PG: 16 MMHG
BH CV ECHO MEAS - TR MAX VEL: 200.1 CM/SEC
BH CV ECHO MEASUREMENTS AVERAGE E/E' RATIO: 11.7
BH CV VAS BP RIGHT ARM: NORMAL MMHG
BH CV XLRA - RV BASE: 4.4 CM
BH CV XLRA - RV LENGTH: 8.2 CM
BH CV XLRA - RV MID: 2.7 CM
BILIRUB SERPL-MCNC: 0.7 MG/DL (ref 0.2–1.2)
BILIRUB SERPL-MCNC: 0.8 MG/DL (ref 0.2–1.2)
BILIRUB SERPL-MCNC: 0.8 MG/DL (ref 0.3–1.2)
BUN BLD-MCNC: 17 MG/DL (ref 8–23)
BUN SERPL-MCNC: 22 MG/DL (ref 9–23)
BUN SERPL-MCNC: 25 MG/DL (ref 8–23)
BUN/CREAT SERPL: 17.9 (ref 7–25)
BUN/CREAT SERPL: 18.1 (ref 7–25)
BUN/CREAT SERPL: 22.2 (ref 7–25)
CALCIUM SERPL-MCNC: 9.6 MG/DL (ref 8.7–10.4)
CALCIUM SERPL-MCNC: 9.9 MG/DL (ref 8.6–10.5)
CALCIUM SPEC-SCNC: 9.3 MG/DL (ref 8.6–10.5)
CHLORIDE SERPL-SCNC: 104 MMOL/L (ref 99–109)
CHLORIDE SERPL-SCNC: 97 MMOL/L (ref 98–107)
CHLORIDE SERPL-SCNC: 99 MMOL/L (ref 98–107)
CHOLEST SERPL-MCNC: 116 MG/DL (ref 0–200)
CHOLEST SERPL-MCNC: 131 MG/DL (ref 0–200)
CHOLEST/HDLC SERPL: 2.27 {RATIO}
CO2 SERPL-SCNC: 25 MMOL/L (ref 22–29)
CO2 SERPL-SCNC: 26 MMOL/L (ref 20–31)
CO2 SERPL-SCNC: 29.4 MMOL/L (ref 22–29)
CREAT BLD-MCNC: 0.94 MG/DL (ref 0.76–1.27)
CREAT SERPL-MCNC: 0.99 MG/DL (ref 0.6–1.3)
CREAT SERPL-MCNC: 1.4 MG/DL (ref 0.76–1.27)
DEPRECATED RDW RBC AUTO: 42.9 FL (ref 37–54)
EOSINOPHIL # BLD AUTO: 0.07 10*3/MM3 (ref 0–0.3)
EOSINOPHIL # BLD AUTO: 0.37 10*3/MM3 (ref 0–0.4)
EOSINOPHIL NFR BLD AUTO: 1 % (ref 0–3)
EOSINOPHIL NFR BLD AUTO: 3.9 % (ref 0.3–6.2)
ERYTHROCYTE [DISTWIDTH] IN BLOOD BY AUTOMATED COUNT: 12.5 % (ref 12.3–15.4)
ERYTHROCYTE [DISTWIDTH] IN BLOOD BY AUTOMATED COUNT: 12.6 % (ref 12.3–15.4)
ERYTHROCYTE [DISTWIDTH] IN BLOOD BY AUTOMATED COUNT: 12.9 % (ref 11.3–14.5)
EXPIRATION DATE: NORMAL
FLUAV AG NPH QL: NEGATIVE
FLUBV AG NPH QL: NEGATIVE
GFR SERPL CREATININE-BSD FRML MDRD: 78 ML/MIN/1.73
GLOBULIN SER CALC-MCNC: 2.1 GM/DL
GLOBULIN SER CALC-MCNC: 2.4 GM/DL
GLOBULIN UR ELPH-MCNC: 2.4 GM/DL
GLUCOSE BLD-MCNC: 106 MG/DL (ref 65–99)
GLUCOSE SERPL-MCNC: 105 MG/DL (ref 70–100)
GLUCOSE SERPL-MCNC: 83 MG/DL (ref 65–99)
HBA1C MFR BLD: 5.6 % (ref 4.8–5.6)
HCT VFR BLD AUTO: 32.6 % (ref 37.5–51)
HCT VFR BLD AUTO: 36.2 % (ref 37.5–51)
HCT VFR BLD AUTO: 39.2 % (ref 38.9–50.9)
HDLC SERPL-MCNC: 51 MG/DL (ref 40–60)
HDLC SERPL-MCNC: 74 MG/DL (ref 40–60)
HGB BLD-MCNC: 11 G/DL (ref 13–17.7)
HGB BLD-MCNC: 12.1 G/DL (ref 13–17.7)
HGB BLD-MCNC: 13.4 G/DL (ref 13.1–17.5)
IMM GRANULOCYTES # BLD AUTO: 0.02 10*3/MM3 (ref 0–0.05)
IMM GRANULOCYTES # BLD AUTO: 0.03 10*3/MM3 (ref 0–0.05)
IMM GRANULOCYTES NFR BLD AUTO: 0.3 % (ref 0–0.5)
IMM GRANULOCYTES NFR BLD AUTO: 0.3 % (ref 0–0.6)
INTERNAL CONTROL: NORMAL
LDLC SERPL CALC-MCNC: 47 MG/DL (ref 0–100)
LDLC SERPL CALC-MCNC: 53 MG/DL (ref 0–100)
LDLC/HDLC SERPL: 0.64 {RATIO}
LEFT ATRIUM VOLUME INDEX: 58.5 ML/M^2
LEFT ATRIUM VOLUME: 123 ML
LV EF 2D ECHO EST: 40 %
LYMPHOCYTES # BLD AUTO: 1.27 10*3/MM3 (ref 0.6–4.8)
LYMPHOCYTES # BLD AUTO: 1.47 10*3/MM3 (ref 0.7–3.1)
LYMPHOCYTES NFR BLD AUTO: 15.4 % (ref 19.6–45.3)
LYMPHOCYTES NFR BLD AUTO: 17.7 % (ref 24–44)
Lab: NORMAL
MCH RBC QN AUTO: 31.5 PG (ref 27–31)
MCH RBC QN AUTO: 31.8 PG (ref 26.6–33)
MCH RBC QN AUTO: 31.8 PG (ref 26.6–33)
MCHC RBC AUTO-ENTMCNC: 33.4 G/DL (ref 31.5–35.7)
MCHC RBC AUTO-ENTMCNC: 33.7 G/DL (ref 31.5–35.7)
MCHC RBC AUTO-ENTMCNC: 34.2 G/DL (ref 32–36)
MCV RBC AUTO: 92.2 FL (ref 80–99)
MCV RBC AUTO: 94.2 FL (ref 79–97)
MCV RBC AUTO: 95 FL (ref 79–97)
MONOCYTES # BLD AUTO: 0.77 10*3/MM3 (ref 0–1)
MONOCYTES # BLD AUTO: 1.08 10*3/MM3 (ref 0.1–0.9)
MONOCYTES NFR BLD AUTO: 10.7 % (ref 0–12)
MONOCYTES NFR BLD AUTO: 11.3 % (ref 5–12)
NEUTROPHILS # BLD AUTO: 5.03 10*3/MM3 (ref 1.5–8.3)
NEUTROPHILS # BLD AUTO: 6.55 10*3/MM3 (ref 1.7–7)
NEUTROPHILS NFR BLD AUTO: 68.7 % (ref 42.7–76)
NEUTROPHILS NFR BLD AUTO: 69.9 % (ref 41–71)
NRBC BLD AUTO-RTO: 0 /100 WBC (ref 0–0.2)
PLATELET # BLD AUTO: 202 10*3/MM3 (ref 140–450)
PLATELET # BLD AUTO: 247 10*3/MM3 (ref 140–450)
PLATELET # BLD AUTO: 269 10*3/MM3 (ref 150–450)
PMV BLD AUTO: 8.8 FL (ref 6–12)
POTASSIUM BLD-SCNC: 3.8 MMOL/L (ref 3.5–5.2)
POTASSIUM SERPL-SCNC: 4.6 MMOL/L (ref 3.5–5.2)
POTASSIUM SERPL-SCNC: 4.6 MMOL/L (ref 3.5–5.5)
PROT SERPL-MCNC: 6.6 G/DL (ref 5.7–8.2)
PROT SERPL-MCNC: 6.8 G/DL (ref 6–8.5)
PROT SERPL-MCNC: 7 G/DL (ref 6–8.5)
RBC # BLD AUTO: 3.46 10*6/MM3 (ref 4.14–5.8)
RBC # BLD AUTO: 3.81 10*6/MM3 (ref 4.14–5.8)
RBC # BLD AUTO: 4.25 10*6/MM3 (ref 4.2–5.76)
SODIUM BLD-SCNC: 139 MMOL/L (ref 136–145)
SODIUM SERPL-SCNC: 138 MMOL/L (ref 136–145)
SODIUM SERPL-SCNC: 139 MMOL/L (ref 132–146)
TRIGL SERPL-MCNC: 50 MG/DL (ref 0–150)
TRIGL SERPL-MCNC: 59 MG/DL (ref 0–150)
VLDLC SERPL CALC-MCNC: 11.8 MG/DL
VLDLC SERPL-MCNC: 10 MG/DL
WBC # BLD AUTO: 7.19 10*3/MM3 (ref 3.5–10.8)
WBC # BLD AUTO: 9.54 10*3/MM3 (ref 3.4–10.8)
WBC NRBC COR # BLD: 7.11 10*3/MM3 (ref 3.4–10.8)

## 2019-01-01 PROCEDURE — S0260 H&P FOR SURGERY: HCPCS | Performed by: NURSE PRACTITIONER

## 2019-01-01 PROCEDURE — 0 IOPAMIDOL PER 1 ML: Performed by: INTERNAL MEDICINE

## 2019-01-01 PROCEDURE — 87804 INFLUENZA ASSAY W/OPTIC: CPT | Performed by: PHYSICIAN ASSISTANT

## 2019-01-01 PROCEDURE — 99214 OFFICE O/P EST MOD 30 MIN: CPT | Performed by: FAMILY MEDICINE

## 2019-01-01 PROCEDURE — 93459 L HRT ART/GRFT ANGIO: CPT | Performed by: INTERNAL MEDICINE

## 2019-01-01 PROCEDURE — 99214 OFFICE O/P EST MOD 30 MIN: CPT | Performed by: INTERNAL MEDICINE

## 2019-01-01 PROCEDURE — 71045 X-RAY EXAM CHEST 1 VIEW: CPT

## 2019-01-01 PROCEDURE — C1887 CATHETER, GUIDING: HCPCS | Performed by: INTERNAL MEDICINE

## 2019-01-01 PROCEDURE — 36415 COLL VENOUS BLD VENIPUNCTURE: CPT

## 2019-01-01 PROCEDURE — C1769 GUIDE WIRE: HCPCS | Performed by: INTERNAL MEDICINE

## 2019-01-01 PROCEDURE — 99213 OFFICE O/P EST LOW 20 MIN: CPT | Performed by: PHYSICIAN ASSISTANT

## 2019-01-01 PROCEDURE — 93306 TTE W/DOPPLER COMPLETE: CPT

## 2019-01-01 PROCEDURE — 83036 HEMOGLOBIN GLYCOSYLATED A1C: CPT | Performed by: NURSE PRACTITIONER

## 2019-01-01 PROCEDURE — 99213 OFFICE O/P EST LOW 20 MIN: CPT | Performed by: FAMILY MEDICINE

## 2019-01-01 PROCEDURE — 93306 TTE W/DOPPLER COMPLETE: CPT | Performed by: INTERNAL MEDICINE

## 2019-01-01 PROCEDURE — 93000 ELECTROCARDIOGRAM COMPLETE: CPT | Performed by: FAMILY MEDICINE

## 2019-01-01 PROCEDURE — 93010 ELECTROCARDIOGRAM REPORT: CPT | Performed by: INTERNAL MEDICINE

## 2019-01-01 PROCEDURE — 25010000002 FENTANYL CITRATE (PF) 100 MCG/2ML SOLUTION: Performed by: INTERNAL MEDICINE

## 2019-01-01 PROCEDURE — 25010000002 MIDAZOLAM PER 1 MG: Performed by: INTERNAL MEDICINE

## 2019-01-01 PROCEDURE — 85027 COMPLETE CBC AUTOMATED: CPT | Performed by: NURSE PRACTITIONER

## 2019-01-01 PROCEDURE — 80061 LIPID PANEL: CPT | Performed by: NURSE PRACTITIONER

## 2019-01-01 PROCEDURE — C1894 INTRO/SHEATH, NON-LASER: HCPCS | Performed by: INTERNAL MEDICINE

## 2019-01-01 PROCEDURE — G0439 PPPS, SUBSEQ VISIT: HCPCS | Performed by: FAMILY MEDICINE

## 2019-01-01 PROCEDURE — 80053 COMPREHEN METABOLIC PANEL: CPT | Performed by: NURSE PRACTITIONER

## 2019-01-01 RX ORDER — ASPIRIN 325 MG
325 TABLET ORAL ONCE
Status: CANCELLED | OUTPATIENT
Start: 2019-01-01 | End: 2019-01-01

## 2019-01-01 RX ORDER — BENZONATATE 100 MG/1
CAPSULE ORAL
COMMUNITY
Start: 2019-01-01

## 2019-01-01 RX ORDER — RANOLAZINE 500 MG/1
TABLET, EXTENDED RELEASE ORAL
COMMUNITY
Start: 2019-01-01

## 2019-01-01 RX ORDER — SODIUM CHLORIDE 9 MG/ML
250 INJECTION, SOLUTION INTRAVENOUS ONCE AS NEEDED
Status: DISCONTINUED | OUTPATIENT
Start: 2019-01-01 | End: 2019-01-01 | Stop reason: HOSPADM

## 2019-01-01 RX ORDER — ZOSTER VACCINE RECOMBINANT, ADJUVANTED 50 MCG/0.5
KIT INTRAMUSCULAR
COMMUNITY
Start: 2019-01-01 | End: 2019-01-01

## 2019-01-01 RX ORDER — AMLODIPINE BESYLATE 10 MG/1
10 TABLET ORAL DAILY
Qty: 90 TABLET | Refills: 3 | Status: SHIPPED | OUTPATIENT
Start: 2019-01-01 | End: 2019-01-01 | Stop reason: ALTCHOICE

## 2019-01-01 RX ORDER — CLOPIDOGREL BISULFATE 75 MG/1
TABLET ORAL
Qty: 90 TABLET | Refills: 3 | Status: SHIPPED | OUTPATIENT
Start: 2019-01-01 | End: 2019-01-01

## 2019-01-01 RX ORDER — ONDANSETRON 2 MG/ML
4 INJECTION INTRAMUSCULAR; INTRAVENOUS EVERY 6 HOURS PRN
Status: DISCONTINUED | OUTPATIENT
Start: 2019-01-01 | End: 2019-01-01 | Stop reason: HOSPADM

## 2019-01-01 RX ORDER — NITROGLYCERIN 0.4 MG/1
0.4 TABLET SUBLINGUAL
Status: DISCONTINUED | OUTPATIENT
Start: 2019-01-01 | End: 2019-01-01 | Stop reason: HOSPADM

## 2019-01-01 RX ORDER — CLOPIDOGREL BISULFATE 75 MG/1
TABLET ORAL
COMMUNITY
Start: 2019-01-01

## 2019-01-01 RX ORDER — OSELTAMIVIR PHOSPHATE 75 MG/1
75 CAPSULE ORAL 2 TIMES DAILY
Qty: 10 CAPSULE | Refills: 0 | Status: SHIPPED | OUTPATIENT
Start: 2019-01-01 | End: 2019-01-01

## 2019-01-01 RX ORDER — HEPATITIS A VACCINE, INACTIVATED 50 [IU]/ML
INJECTION, SUSPENSION INTRAMUSCULAR
COMMUNITY
Start: 2019-01-01 | End: 2019-01-01

## 2019-01-01 RX ORDER — PRAVASTATIN SODIUM 80 MG/1
TABLET ORAL
Qty: 90 TABLET | Refills: 2 | Status: SHIPPED | OUTPATIENT
Start: 2019-01-01

## 2019-01-01 RX ORDER — SODIUM CHLORIDE 0.9 % (FLUSH) 0.9 %
3 SYRINGE (ML) INJECTION EVERY 12 HOURS SCHEDULED
Status: DISCONTINUED | OUTPATIENT
Start: 2019-01-01 | End: 2019-01-01 | Stop reason: HOSPADM

## 2019-01-01 RX ORDER — CARVEDILOL 6.25 MG/1
3.12 TABLET ORAL 2 TIMES DAILY WITH MEALS
Qty: 60 TABLET | Refills: 11 | Status: SHIPPED | OUTPATIENT
Start: 2019-01-01 | End: 2019-01-01 | Stop reason: SDUPTHER

## 2019-01-01 RX ORDER — SODIUM CHLORIDE 0.9 % (FLUSH) 0.9 %
3 SYRINGE (ML) INJECTION EVERY 12 HOURS SCHEDULED
Status: CANCELLED | OUTPATIENT
Start: 2019-01-01

## 2019-01-01 RX ORDER — ERYTHROMYCIN 5 MG/G
OINTMENT OPHTHALMIC
COMMUNITY
Start: 2019-01-01 | End: 2019-01-01

## 2019-01-01 RX ORDER — MIDAZOLAM HYDROCHLORIDE 1 MG/ML
INJECTION INTRAMUSCULAR; INTRAVENOUS AS NEEDED
Status: DISCONTINUED | OUTPATIENT
Start: 2019-01-01 | End: 2019-01-01 | Stop reason: HOSPADM

## 2019-01-01 RX ORDER — ASPIRIN 325 MG
325 TABLET ORAL ONCE
Status: COMPLETED | OUTPATIENT
Start: 2019-01-01 | End: 2019-01-01

## 2019-01-01 RX ORDER — ASPIRIN 325 MG
325 TABLET, DELAYED RELEASE (ENTERIC COATED) ORAL DAILY
Status: CANCELLED | OUTPATIENT
Start: 2019-01-01

## 2019-01-01 RX ORDER — SODIUM CHLORIDE 0.9 % (FLUSH) 0.9 %
1-10 SYRINGE (ML) INJECTION AS NEEDED
Status: CANCELLED | OUTPATIENT
Start: 2019-01-01

## 2019-01-01 RX ORDER — SODIUM CHLORIDE 0.9 % (FLUSH) 0.9 %
1-10 SYRINGE (ML) INJECTION AS NEEDED
Status: DISCONTINUED | OUTPATIENT
Start: 2019-01-01 | End: 2019-01-01 | Stop reason: HOSPADM

## 2019-01-01 RX ORDER — BACLOFEN 10 MG/1
10 TABLET ORAL 3 TIMES DAILY
Qty: 30 TABLET | Refills: 2 | Status: SHIPPED | OUTPATIENT
Start: 2019-01-01 | End: 2019-01-01 | Stop reason: SDUPTHER

## 2019-01-01 RX ORDER — CARVEDILOL 3.12 MG/1
3.12 TABLET ORAL 2 TIMES DAILY WITH MEALS
Qty: 180 TABLET | Refills: 3 | Status: SHIPPED | OUTPATIENT
Start: 2019-01-01

## 2019-01-01 RX ORDER — ACETAMINOPHEN 325 MG/1
650 TABLET ORAL EVERY 4 HOURS PRN
Status: DISCONTINUED | OUTPATIENT
Start: 2019-01-01 | End: 2019-01-01 | Stop reason: HOSPADM

## 2019-01-01 RX ORDER — NITROGLYCERIN 0.4 MG/1
0.4 TABLET SUBLINGUAL
Status: CANCELLED | OUTPATIENT
Start: 2019-01-01

## 2019-01-01 RX ORDER — ASPIRIN 325 MG
325 TABLET, DELAYED RELEASE (ENTERIC COATED) ORAL DAILY
Status: DISCONTINUED | OUTPATIENT
Start: 2019-01-01 | End: 2019-01-01 | Stop reason: HOSPADM

## 2019-01-01 RX ORDER — ONDANSETRON 2 MG/ML
4 INJECTION INTRAMUSCULAR; INTRAVENOUS EVERY 6 HOURS PRN
Status: CANCELLED | OUTPATIENT
Start: 2019-01-01

## 2019-01-01 RX ORDER — FENTANYL CITRATE 50 UG/ML
INJECTION, SOLUTION INTRAMUSCULAR; INTRAVENOUS AS NEEDED
Status: DISCONTINUED | OUTPATIENT
Start: 2019-01-01 | End: 2019-01-01 | Stop reason: HOSPADM

## 2019-01-01 RX ORDER — LIDOCAINE HYDROCHLORIDE 10 MG/ML
INJECTION, SOLUTION EPIDURAL; INFILTRATION; INTRACAUDAL; PERINEURAL AS NEEDED
Status: DISCONTINUED | OUTPATIENT
Start: 2019-01-01 | End: 2019-01-01 | Stop reason: HOSPADM

## 2019-01-01 RX ORDER — BACLOFEN 10 MG/1
TABLET ORAL
Qty: 45 TABLET | Refills: 2 | Status: SHIPPED | OUTPATIENT
Start: 2019-01-01

## 2019-01-01 RX ORDER — FUROSEMIDE 20 MG/1
20-40 TABLET ORAL DAILY
COMMUNITY
Start: 2019-01-01

## 2019-01-01 RX ORDER — LISINOPRIL AND HYDROCHLOROTHIAZIDE 20; 12.5 MG/1; MG/1
TABLET ORAL
Qty: 90 TABLET | Refills: 3 | Status: SHIPPED | OUTPATIENT
Start: 2019-01-01 | End: 2019-01-01 | Stop reason: ALTCHOICE

## 2019-01-01 RX ORDER — HEPATITIS A VACCINE, INACTIVATED 50 [IU]/ML
INJECTION, SUSPENSION INTRAMUSCULAR
COMMUNITY
Start: 2018-01-01 | End: 2019-01-01

## 2019-01-01 RX ORDER — FUROSEMIDE 40 MG/1
40 TABLET ORAL DAILY
Qty: 30 TABLET | Refills: 5 | Status: SHIPPED | OUTPATIENT
Start: 2019-01-01 | End: 2019-01-01

## 2019-01-01 RX ORDER — CARVEDILOL 6.25 MG/1
6.25 TABLET ORAL 2 TIMES DAILY WITH MEALS
Qty: 60 TABLET | Refills: 11 | Status: SHIPPED | OUTPATIENT
Start: 2019-01-01 | End: 2019-01-01

## 2019-01-01 RX ORDER — NITROGLYCERIN 40 MG/1
PATCH TRANSDERMAL
COMMUNITY
Start: 2019-01-01

## 2019-01-01 RX ADMIN — ASPIRIN 325 MG ORAL TABLET 325 MG: 325 PILL ORAL at 10:28

## 2019-02-05 NOTE — PROGRESS NOTES
Assessment/Plan       Problems Addressed this Visit     None      Visit Diagnoses     Neck pain    -  Primary    Relevant Medications    baclofen (LIORESAL) 10 MG tablet    Shortness of breath                Follow up: Return if symptoms worsen or fail to improve.     DISCUSSION  Neck pain.  Suspect musculoskeletal.  Continue either Aleve or ibuprofen sparingly and will try baclofen so he can take something during the day.  Side effects explained that it may cause some sedation.  If not improving, he will let us know.    Shortness of breath.  Chronic.  Has chronic coronary disease.  No active chest pain currently.  Did have some palpitations yesterday.  No palpitations today.  Call if symptoms return.      MEDICATIONS PRESCRIBED  Requested Prescriptions     Signed Prescriptions Disp Refills   • baclofen (LIORESAL) 10 MG tablet 30 tablet 2     Sig: Take 1 tablet by mouth 3 (Three) Times a Day.            -------------------------------------------    Subjective     Chief Complaint   Patient presents with   • Back Pain     worse, trouble breathing    • Neck Pain         Neck Pain    This is a chronic (worse yesterday afternoon) problem. The current episode started yesterday. The problem occurs constantly. The problem has been unchanged. The pain is associated with nothing. The pain is present in the right side (mainly on right). The quality of the pain is described as aching. The symptoms are aggravated by bending, twisting and position. Associated symptoms include chest pain (chronic pain , has seen cardiology) and numbness (fingers at times but did this am). He has tried muscle relaxants (took tizanadine x 2 and has helped a little) for the symptoms. The treatment provided mild relief.   Shortness of Breath   This is a recurrent problem. Episode onset: for awhile. The problem occurs intermittently. The problem has been gradually worsening. Associated symptoms include chest pain (chronic pain , has seen cardiology)  "and neck pain. The symptoms are aggravated by any activity. Associated symptoms comments: Worse with walking and getting worse.     Had some palpitations yesterday but none today.  No current active chest pain but does get chest pain periodically with activity which is not new and also after eating.  Has known coronary disease.  Has seen cardiology.    Tizanidine causes sedation and not able to take during the day    Aleve Motrin as needed      Social History     Tobacco Use   Smoking Status Former Smoker   • Last attempt to quit:    • Years since quittin.1   Smokeless Tobacco Never Used        Past Medical History,Medications, Allergies, and social history was reviewed.      Review of Systems   Respiratory: Positive for shortness of breath.    Cardiovascular: Positive for chest pain (chronic pain , has seen cardiology).   Musculoskeletal: Positive for back pain and neck pain.   Neurological: Positive for numbness (fingers at times but did this am).       Objective     Vitals:    19 1245   BP: 142/70   Pulse: 63   Resp: 20   Temp: 97.5 °F (36.4 °C)   SpO2: 96%   Weight: 105 kg (231 lb 8 oz)   Height: 172.7 cm (68\")          Physical Exam   Constitutional: He is oriented to person, place, and time. He appears well-developed and well-nourished.   HENT:   Head: Normocephalic and atraumatic.   Eyes: EOM are normal. Pupils are equal, round, and reactive to light.   Cardiovascular: Normal rate, regular rhythm and normal heart sounds.   No murmur heard.  Pulmonary/Chest: Effort normal and breath sounds normal. No stridor. No respiratory distress. He has no wheezes. He has no rales.   Musculoskeletal:        Cervical back: He exhibits decreased range of motion (increased pain with turning head and leaning head to left on the right) and tenderness (right paraspinous to occiput. ).   Neurological: He is alert and oriented to person, place, and time. He has normal strength.   Reflex Scores:       Bicep reflexes " are 1+ on the right side and 1+ on the left side.  UE strength normal, no gross sensory loss     Skin: Skin is warm and dry.   Psychiatric: He has a normal mood and affect. His behavior is normal.   Nursing note and vitals reviewed.                Antonio Hatch MD

## 2019-03-12 NOTE — PROGRESS NOTES
Subjective   Zach Ramsey is a 78 y.o. male.     History of Present Illness   Pt presents with CC of fever, chills, body aches, hacking cough, and HA. Symptoms started suddenly yesterday. Wife has also had similar symptoms and being seen in office today as well. No known sick contacts, however do go out a lot to flea markets     The following portions of the patient's history were reviewed and updated as appropriate: allergies, current medications, past family history, past medical history, past social history, past surgical history and problem list.    Review of Systems   Constitutional: Positive for chills, fatigue and fever. Negative for diaphoresis.   HENT: Positive for congestion and rhinorrhea. Negative for ear discharge, ear pain, hearing loss, nosebleeds, postnasal drip, sinus pressure, sneezing and sore throat.    Eyes: Negative.    Respiratory: Positive for cough. Negative for chest tightness, shortness of breath and wheezing.    Cardiovascular: Negative.  Negative for chest pain, palpitations and leg swelling.   Gastrointestinal: Negative for abdominal distention, abdominal pain, blood in stool, constipation, diarrhea, nausea and vomiting.   Genitourinary: Negative.  Negative for difficulty urinating, dysuria, flank pain, frequency, hematuria and urgency.   Musculoskeletal: Positive for myalgias. Negative for arthralgias, back pain, gait problem, joint swelling, neck pain and neck stiffness.   Skin: Negative.  Negative for color change, pallor, rash and wound.   Neurological: Positive for headaches. Negative for dizziness, syncope, weakness, light-headedness and numbness.       Objective    Blood pressure 144/74, pulse 94, temperature 99.6 °F (37.6 °C), temperature source Temporal, resp. rate 20, weight 98.9 kg (218 lb), SpO2 95 %.     Physical Exam   Constitutional: He is oriented to person, place, and time. He appears well-developed and well-nourished.   HENT:   Head: Normocephalic and atraumatic.    Right Ear: Tympanic membrane, external ear and ear canal normal.   Left Ear: Tympanic membrane, external ear and ear canal normal.   Nose: Mucosal edema present. Right sinus exhibits no maxillary sinus tenderness and no frontal sinus tenderness. Left sinus exhibits no maxillary sinus tenderness and no frontal sinus tenderness.   Mouth/Throat: Oropharynx is clear and moist. No oropharyngeal exudate, posterior oropharyngeal edema or posterior oropharyngeal erythema.   Eyes: Conjunctivae are normal.   Neck: Normal range of motion. Neck supple. No tracheal deviation present.   Cardiovascular: Normal rate, regular rhythm, normal heart sounds and intact distal pulses.   Pulmonary/Chest: Effort normal and breath sounds normal. No respiratory distress. He has no wheezes. He has no rales. He exhibits no tenderness.   Hacking cough    Lymphadenopathy:     He has no cervical adenopathy.   Neurological: He is alert and oriented to person, place, and time.   Skin: Skin is warm and dry.   Psychiatric: He has a normal mood and affect. His behavior is normal. Judgment and thought content normal.   Nursing note and vitals reviewed.      Assessment/Plan   Zach was seen today for fever, cough and generalized body aches.    Diagnoses and all orders for this visit:    Flu-like symptoms  -     oseltamivir (TAMIFLU) 75 MG capsule; Take 1 capsule by mouth 2 (Two) Times a Day.    Body aches  -     POCT Influenza A/B    Cough    pt testing negative for influenza A and B (pt aware), however symptoms are very consistent with influenza. Will go ahead and begin treatment with tamiflu as directed. F/U INB

## 2019-03-27 NOTE — PROGRESS NOTES
Subsequent Medicare Wellness Visit   The ABC's of the Annual Wellness Visit    Chief Complaint   Patient presents with   • Medicare Wellness-subsequent       HPI:  Zach Ramsey, -1940, is a 78 y.o. male who presents for a Subsequent Medicare Wellness Visit.      Had recent flu and getting better  Hard to do anything in winter    Hypertension  Doing well  No chest pain since started diet  Gets winded at times  No change in med  Has 13 pounds    CAD  Sees Dr Wallis  Chest pain   Takes meds   Still on plavix    hyperlipidemia  No myalgias with meds  Has lost 13 pounds on Nutrasystem      Recent Hospitalizations:  No hospitalization(s) within the last year..    Current Medical Providers:  Patient Care Team:  Antonio Hatch MD as PCP - General  Antonio Hatch MD as PCP - Family Medicine  Antonio Hatch MD as PCP - Claims Attributed    Health Habits and Functional and Cognitive Screening and Depression Screening:  Functional & Cognitive Status 3/27/2019   Do you have difficulty preparing food and eating? No   Do you have difficulty bathing yourself, getting dressed or grooming yourself? No   Do you have difficulty using the toilet? No   Do you have difficulty moving around from place to place? No   Do you have trouble with steps or getting out of a bed or a chair? No   In the past year have you fallen or experienced a near fall? No   Current Diet Well Balanced Diet   Dental Exam Not up to date   Eye Exam Up to date   Exercise (times per week) 5 times per week   Current Exercise Activities Include Cardiovasular Workout on Exercise Equipment   Do you need help using the phone?  No   Are you deaf or do you have serious difficulty hearing?  No   Do you need help with transportation? No   Do you need help shopping? No   Do you need help preparing meals?  No   Do you need help with housework?  No   Do you need help with laundry? No   Do you need help taking your medications? No   Do you need help managing money?  No   Do you ever drive or ride in a car without wearing a seat belt? No   Have you felt unusual stress, anger or loneliness in the last month? -   Who do you live with? -   If you need help, do you have trouble finding someone available to you? -   Have you been bothered in the last four weeks by sexual problems? -   Do you have difficulty concentrating, remembering or making decisions? -       Compared to one year ago, the patient feels his physical health is better and his mental health is better. Depressed mood with winter weather    Depression Screen:  PHQ-2/PHQ-9 Depression Screening 3/27/2019   Little interest or pleasure in doing things 1   Feeling down, depressed, or hopeless 0   Total Score 1         Past Medical/Family/Social History:  The following portions of the patient's history were reviewed and updated as appropriate: allergies, current medications, past family history, past medical history, past social history, past surgical history and problem list.    Allergies   Allergen Reactions   • Isosorbide Nitrate Dizziness   • Metoprolol Nausea Only         Current Outpatient Medications:   •  albuterol (PROVENTIL HFA;VENTOLIN HFA) 108 (90 Base) MCG/ACT inhaler, Inhale 1-2 puffs Every 4 (Four) Hours As Needed for Wheezing., Disp: 18 g, Rfl: 0  •  amLODIPine (NORVASC) 10 MG tablet, Take 1 tablet by mouth Daily., Disp: 30 tablet, Rfl: 11  •  aspirin 81 MG EC tablet, Take  by mouth daily., Disp: , Rfl:   •  baclofen (LIORESAL) 10 MG tablet, Take 1 tablet by mouth 3 (Three) Times a Day., Disp: 30 tablet, Rfl: 2  •  clopidogrel (PLAVIX) 75 MG tablet, TAKE 1 TABLET DAILY, Disp: 90 tablet, Rfl: 3  •  lisinopril-hydrochlorothiazide (PRINZIDE,ZESTORETIC) 20-12.5 MG per tablet, TAKE 1 TABLET DAILY, Disp: 90 tablet, Rfl: 3  •  nitroglycerin (NITROSTAT) 0.4 MG SL tablet, Place 1 tablet under the tongue Every 5 (Five) Minutes As Needed for Chest Pain., Disp: 25 tablet, Rfl: 6  •  pantoprazole (PROTONIX) 40 MG EC  tablet, TAKE 1 TABLET DAILY, Disp: 90 tablet, Rfl: 3  •  pravastatin (PRAVACHOL) 80 MG tablet, TAKE 1 TABLET DAILY, Disp: 90 tablet, Rfl: 2  •  tiZANidine (ZANAFLEX) 4 MG tablet, Take 1 tablet by mouth Every 6 (Six) Hours As Needed for Muscle Spasms., Disp: 30 tablet, Rfl: 5  •  Zoster Vac Recomb Adjuvanted 50 MCG/0.5ML reconstituted suspension, Inject 50 mcg into the appropriate muscle as directed by prescriber 1 (One) Time for 1 dose., Disp: 1 each, Rfl: 1    Aspirin use counseling: Taking ASA appropriately as indicated. + CAD    Current medication list contains high risk medications. No harmful drug interactions have been identified.  Plan of action continue monitor. Needs meds ie blood thinners with CAD    Family History   Problem Relation Age of Onset   • Diabetes Mother    • Coronary artery disease Father        Social History     Tobacco Use   • Smoking status: Former Smoker     Last attempt to quit:      Years since quittin.2   • Smokeless tobacco: Never Used   Substance Use Topics   • Alcohol use: No       Past Surgical History:   Procedure Laterality Date   • CORONARY ARTERY BYPASS GRAFT     • NECK SURGERY      Remote neck surgery.    • OTHER SURGICAL HISTORY      Lithotomy   • TONSILLECTOMY     • TOTAL KNEE ARTHROPLASTY Right     Right total knee replacement.        Patient Active Problem List   Diagnosis   • Aortic valve insufficiency   • Atherosclerosis of coronary artery   • Gastroesophageal reflux disease with esophagitis   • Hyperlipidemia   • Essential hypertension   • Coronary artery disease   • Dyslipidemia   • Arthritis   • Nephrolithiasis   • Hyperglycemia   • Asthma   • Peripheral arterial disease (CMS/HCC)       Review of Systems   Constitutional: Positive for fatigue.   HENT: Negative.    Respiratory: Positive for shortness of breath (at times).    Cardiovascular: Negative.  Negative for chest pain and leg swelling.   Gastrointestinal: Negative.    Genitourinary: Negative.          "Slow stream at times. Increased water during the day. Occ dribbling/ up 3-4 times to urinate   Psychiatric/Behavioral: Positive for confusion (at times. harder to follow instructions. Has to think things through). The patient is not nervous/anxious.         Depressed mood.        Objective     Vitals:    03/27/19 0919   BP: 118/78   Pulse: 78   Resp: 18   Temp: 97.2 °F (36.2 °C)   Weight: 96.2 kg (212 lb)   Height: 172.7 cm (68\")   PainSc: 2  Comment: side from lifting yesturday       Patient's Body mass index is 32.23 kg/m². BMI is above normal parameters. Recommendations include: contunue wt loss and increase activity as Spring comes.      No exam data present    Memory  Ace Mini score 27/30           Physical Exam   Constitutional: He is oriented to person, place, and time. He appears well-developed and well-nourished. No distress.   HENT:   Head: Normocephalic and atraumatic.   Right Ear: Hearing, tympanic membrane, external ear and ear canal normal.   Left Ear: Hearing, tympanic membrane, external ear and ear canal normal.   Mouth/Throat: Oropharynx is clear and moist. No oropharyngeal exudate.   Eyes: Conjunctivae and EOM are normal. Pupils are equal, round, and reactive to light. Right eye exhibits no discharge. Left eye exhibits no discharge. No scleral icterus.   Neck: Normal range of motion. No thyromegaly present.   Cardiovascular: Normal rate, regular rhythm and normal heart sounds. Exam reveals no gallop and no friction rub.   No murmur heard.  Pulmonary/Chest: Effort normal and breath sounds normal. No respiratory distress. He has no wheezes. He has no rales.   Abdominal: Soft. Bowel sounds are normal. There is no tenderness. There is no rebound and no guarding.   Musculoskeletal: He exhibits no edema.   Lymphadenopathy:     He has no cervical adenopathy.   Neurological: He is alert and oriented to person, place, and time.   Skin: He is not diaphoretic.   Psychiatric: He has a normal mood and affect. " His behavior is normal.   Nursing note and vitals reviewed.      Recent Lab Results:  Lab Results   Component Value Date    GLU 88 11/06/2018     Lab Results   Component Value Date    TRIG 49 11/06/2018    HDL 73 (H) 11/06/2018    VLDL 9.8 11/06/2018       Assessment/Plan   Age-appropriate Screening Schedule:  Refer to the list below for future screening recommendations based on patient's age, sex and/or medical conditions.      Health Maintenance   Topic Date Due   • TDAP/TD VACCINES (1 - Tdap) 04/26/1960   • ZOSTER VACCINE (1 of 2) 04/26/1991   • LIPID PANEL  11/06/2019   • COLONOSCOPY  11/25/2024   • INFLUENZA VACCINE  Completed   • PNEUMOCOCCAL VACCINES (65+ LOW/MEDIUM RISK)  Completed       Medicare Risks and Personalized Health Plan:  Hearing Loss or problem identified;   continue to monitor and refer worsens. had eval in the past and did not afford hearing aids  Immunizations Discussed/Encouraged (specific immunizations; Shingrix )      CMS-Preventive Services Quick Reference  Medicare Preventive Services Addressed:  Annual wellness exam today      Advance Care Planning:  Patient does not have an advance directive - information provided to the patient today   Wife has POA for medical and financial    Diagnoses and all orders for this visit:    1. Medicare annual wellness visit, subsequent (Primary)    2. Essential hypertension  -     CBC & Differential    3. Coronary artery disease involving native coronary artery of native heart with other form of angina pectoris (CMS/[facility])  -     Comprehensive Metabolic Panel  -     CBC & Differential    4. Mixed hyperlipidemia  -     Comprehensive Metabolic Panel  -     Lipid Panel With / Chol / HDL Ratio    5. Need for shingles vaccine  -     Zoster Vac Recomb Adjuvanted 50 MCG/0.5ML reconstituted suspension; Inject 50 mcg into the appropriate muscle as directed by prescriber 1 (One) Time for 1 dose.  Dispense: 1 each; Refill: 1        An After Visit Summary and PPPS with  all of these plans were given to the patient.      Follow Up:  Return for follow up depends on review of labs and testing.        Overall stable.  Check labs as noted.    Continue routine health maintenance including routine dentistry, eye exams, safety, exercise and proper nutrition.    Further plan was labs reviewed.      Antonio Hatch MD

## 2019-05-28 NOTE — TELEPHONE ENCOUNTER
Please call patient and make sure aware of appointment coming up with cardiology as being scheduled on June 5, 2019.  Looks like at 1:45 PM.

## 2019-05-28 NOTE — PROGRESS NOTES
Assessment/Plan       Problems Addressed this Visit     None      Visit Diagnoses     Atrial fibrillation, unspecified type (CMS/[facility])    -  Primary    Relevant Medications    apixaban (ELIQUIS) 5 MG tablet tablet            Follow up: Return for follow up depends on review of labs and testing.     DISCUSSION  New onset atrial fibrillation.  Need to hold off on the cataract surgery at this time.  Chads score is 3.  Start Eliquis 5 mg twice a day.  Since no stent, will hold off on Plavix unless cardiology wishes for him to be on both.  May continue 81 mg aspirin at this time.  We will send a message to cardiology to get him evaluated as soon as possible.  May be candidate for cardioversion or other medication to treat arrhythmia.  Patient is agreeable.  Seek urgent medical attention if develops symptoms consistent with stroke.  Or if develops increasing chest pain or shortness of breath.      MEDICATIONS PRESCRIBED  Requested Prescriptions     Signed Prescriptions Disp Refills   • apixaban (ELIQUIS) 5 MG tablet tablet 28 tablet 0     Sig: Take 1 tablet by mouth Every 12 (Twelve) Hours.            -------------------------------------------    Subjective     Chief Complaint   Patient presents with   • Pre-op Exam         History of Present Illness    Patient was initially here for preoperative physical.  During the preoperative evaluation, EKG revealed new onset atrial fibrillation rate controlled.  He denies any history of atrial fibrillation.  He does have history of coronary artery disease with history of coronary artery bypass graft many years ago.  He is currently on Plavix.  He states that he has never had a stent.  Denies any history of stroke.  Does have peripheral arterial disease.  He also has hypertension.  He is only on Plavix and aspirin at this time.  He has intermittent chest pain and shortness of breath with exertion which is previously been evaluated with cath several years ago.  The symptoms have  "persisted.  At this time, he is not having any chest pain or shortness of breath during the evaluation.        Social History     Tobacco Use   Smoking Status Former Smoker   • Last attempt to quit:    • Years since quittin.4   Smokeless Tobacco Never Used        Past Medical History,Medications, Allergies, and social history was reviewed.      Review of Systems   Constitutional: Positive for fatigue.   HENT: Negative.    Respiratory: Positive for shortness of breath ( At times).    Cardiovascular: Positive for chest pain ( With exertion and after eating).   Gastrointestinal: Negative.    Neurological: Negative.    Psychiatric/Behavioral: Negative.        Objective     Vitals:    19 0958   BP: 136/64   Pulse: 115   Resp: 18   Temp: 98 °F (36.7 °C)   SpO2: 95%   Weight: 97.8 kg (215 lb 8 oz)   Height: 172.7 cm (68\")           Physical Exam   Constitutional: Vital signs are normal. He appears well-developed and well-nourished.   HENT:   Head: Normocephalic and atraumatic.   Right Ear: Hearing, tympanic membrane, external ear and ear canal normal.   Left Ear: Hearing, tympanic membrane, external ear and ear canal normal.   Mouth/Throat: Oropharynx is clear and moist.   Eyes: Conjunctivae, EOM and lids are normal. Pupils are equal, round, and reactive to light.   Neck: Normal range of motion. Neck supple. No thyromegaly present.   Cardiovascular: Normal rate and normal heart sounds. An irregularly irregular rhythm present. Exam reveals no friction rub.   No murmur heard.  Pulmonary/Chest: Effort normal and breath sounds normal. No respiratory distress. He has no wheezes. He has no rales.   Abdominal: Soft. Normal appearance and bowel sounds are normal. He exhibits no distension and no mass. There is no tenderness. There is no rebound and no guarding.   Musculoskeletal: He exhibits edema ( Trace).   Neurological: He is alert. He has normal strength.   Skin: Skin is warm and dry.   Psychiatric: He has a " normal mood and affect. His speech is normal. Cognition and memory are normal.   Nursing note and vitals reviewed.      ECG 12 Lead  Date/Time: 5/28/2019 10:15 AM  Performed by: Antonio Hatch MD  Authorized by: Antonio Hatch MD   Comparison: compared with previous ECG from 8/2/2017  Comparison to previous ECG: New onset atrial fibrillation  Rhythm: atrial fibrillation  Rate: normal  BPM: 70  Conduction: conduction normal  ST Segments: ST segments normal  T Waves: T waves normal  QRS axis: normal  Other: no other findings  Other findings comments: 1 PVC    Clinical impression: abnormal EKG  Comments: Rate controlled atrial fibrillation                    Antonio Hatch MD

## 2019-05-28 NOTE — TELEPHONE ENCOUNTER
----- Message from CARLOS Viera sent at 5/28/2019 12:57 PM EDT -----  Regarding: FW: Needs appointment  Please put this patient on for Wednesday next week  ----- Message -----  From: Antonio Hatch MD  Sent: 5/28/2019  10:40 AM  To: CARLOS Viera, Lucas Wallis MD  Subject: Needs appointment                                Zach Ramsey was here for a preop for cataracts and EKG revealed Afib(rate 70) which is new for him when compared to old EKGs. I have started him on Eliquis with Chads score of 3 but would like for him to see you for evaluation and determination of next step. Since he has not had a stent, I had him stop the Plavix unless you think he needs to continue this in addition to the Eliquis. Please let me know if you are able to see him.     He still gets exertional chest pain and shortness of breath as well.     Thanks    Antonio Hatch MD

## 2019-06-05 PROBLEM — I48.19 PERSISTENT ATRIAL FIBRILLATION (HCC): Status: ACTIVE | Noted: 2019-01-01

## 2019-06-05 NOTE — PROGRESS NOTES
Subjective:     Encounter Date:06/05/2019    Primary Care Physician: Antonio Hatch MD      Patient ID: Zach Ramsey is a 79 y.o. male.    Chief Complaint:Abnormal ECG and Follow-up    PROBLEM LIST:  1. Coronary artery disease.  a. In 2000, coronary artery bypass grafting.   b. In 2010, cardiac catheterization done at [facility] which showed normal left ventricular function. Three vessel native coronary disease. Patent vein graft to the PDA, patent vein graft to the diagonal and OM branches, and a patent LIMA to the LAD.   c. Echocardiogram, June 2014 which showed mild  to moderate aortic regurgitation. Normal LVEF.   d. Cardiac catheterization, 09/04/2015, revealing patent URIOSTEGUI to LAD, patent saphenous vein graft to the D1, OM1 and OM2, patent saphenous vein graft to the PDA and native right coronary artery with multiple  stenoses and heavily calcified areas that supply relative little left ventricular myocardium, medical management.     2. Atrial fibrillation  a.  noted in pre op evaluation with PCP, rate controlled  b. CHADSVASC- 4  c. Started on Eliquis by PCP  3. Hypertension.   4. Dyslipidemia.   5. Gastroesophageal reflux disease.   6. Arthritis.   7. Nephrolithiasis.   8. Mild esophageal stricture s/p dilatation  9. Remote neck surgery.   10. Right total knee replacement.   11. Tonsillectomy.   12. Remote tobacco use with cessation in 1977.       Allergies   Allergen Reactions   • Isosorbide Nitrate Dizziness   • Metoprolol Nausea Only         Current Outpatient Medications:   •  amLODIPine (NORVASC) 10 MG tablet, Take 1 tablet by mouth Daily., Disp: 90 tablet, Rfl: 3  •  apixaban (ELIQUIS) 5 MG tablet tablet, Take 1 tablet by mouth Every 12 (Twelve) Hours., Disp: 28 tablet, Rfl: 0  •  aspirin 81 MG EC tablet, Take  by mouth daily., Disp: , Rfl:   •  baclofen (LIORESAL) 10 MG tablet, Take 1 tablet by mouth 3 (Three) Times a Day., Disp: 30 tablet, Rfl: 2  •  erythromycin (ROMYCIN) 5 MG/GM  ophthalmic ointment, , Disp: , Rfl:   •  lisinopril-hydrochlorothiazide (PRINZIDE,ZESTORETIC) 20-12.5 MG per tablet, TAKE 1 TABLET DAILY, Disp: 90 tablet, Rfl: 3  •  nitroglycerin (NITROSTAT) 0.4 MG SL tablet, Place 1 tablet under the tongue Every 5 (Five) Minutes As Needed for Chest Pain., Disp: 25 tablet, Rfl: 6  •  pantoprazole (PROTONIX) 40 MG EC tablet, TAKE 1 TABLET DAILY, Disp: 90 tablet, Rfl: 3  •  pravastatin (PRAVACHOL) 80 MG tablet, TAKE 1 TABLET DAILY, Disp: 90 tablet, Rfl: 2        History of Present Illness    She returns today for further evaluation H fibrillation and progressive dyspnea/chest pain.  Patient notes over the last 3 to 6 months progressive dyspnea on exertion associated with chest tightness radiation to the back of the neck and left arm that occurs with exertion.  He is currently functional class III.  He is unable to walk stairs.  His symptoms are always relieved with rest after 1 to 2 minutes.  He is never had the onset of the symptoms during rest.  No presyncope or syncope.  Does not see as tachypalpitations.  He was seen by his primary care physician 2 weeks ago, it was noted to have new onset atrial fibrillation, and was referred back to see us.  He also notes some mild orthopnea, but more significant increasing edema.    The following portions of the patient's history were reviewed and updated as appropriate: allergies, current medications, past family history, past medical history, past social history, past surgical history and problem list.      Social History     Tobacco Use   • Smoking status: Former Smoker     Last attempt to quit:      Years since quittin.4   • Smokeless tobacco: Never Used   Substance Use Topics   • Alcohol use: No   • Drug use: No         Review of Systems   Constitution: Positive for weakness and malaise/fatigue.   HENT: Positive for hearing loss.    Eyes: Positive for blurred vision.   Cardiovascular: Positive for chest pain, irregular heartbeat  "and leg swelling.   Respiratory: Positive for shortness of breath, snoring and wheezing.    Hematologic/Lymphatic: Negative for bleeding problem. Does not bruise/bleed easily.   Skin: Negative for rash.   Musculoskeletal: Positive for arthritis, back pain, joint pain, muscle cramps, muscle weakness and neck pain. Negative for myalgias.   Gastrointestinal: Negative for heartburn, nausea and vomiting.   Neurological: Positive for loss of balance and numbness.          Objective:    height is 172.7 cm (68\") and weight is 97.1 kg (214 lb). His blood pressure is 130/62 and his pulse is 72. His oxygen saturation is 98%.         Physical Exam   Constitutional: He is oriented to person, place, and time. He appears well-developed and well-nourished.   HENT:   Mouth/Throat: Oropharynx is clear and moist.   Neck: No JVD present. Carotid bruit is not present. No thyromegaly present.   Cardiovascular: Regular rhythm, S1 normal, S2 normal and intact distal pulses. Exam reveals no gallop, no S3 and no S4.   Murmur heard.   Systolic murmur is present with a grade of 3/6 at the lower left sternal border radiating to the apex.  Pulses:       Carotid pulses are 2+ on the right side, and 2+ on the left side.       Radial pulses are 2+ on the right side, and 2+ on the left side.   Pulmonary/Chest: He has rales in the right lower field and the left lower field.   Abdominal: Soft. Bowel sounds are normal. He exhibits no mass. There is no tenderness.   Musculoskeletal: He exhibits edema (1-2+ bilateral pretibial).   Neurological: He is alert and oriented to person, place, and time.   Skin: Skin is warm and dry. No rash noted.         ECG 12 Lead  Date/Time: 6/5/2019 2:20 PM  Performed by: Lucas Wallis MD  Authorized by: Lucas Wallis MD   Rhythm: atrial fibrillation  Other findings: left ventricular hypertrophy with strain                  Assessment:   Assessment/Plan      Zach was seen today for abnormal ecg and " follow-up.    Diagnoses and all orders for this visit:    Coronary artery disease involving native coronary artery of native heart with other form of angina pectoris (CMS/HCC)    Acute congestive heart failure, unspecified heart failure type (CMS/HCC)    Persistent atrial fibrillation (CMS/HCC)    Dyslipidemia    Essential hypertension    Other orders  -     ECG 12 Lead      1.  Coronary artery disease, 19 years post CABG.  Now with progressive functional class III angina.  This despite medical therapy.  2.  New onset heart failure presumed diastolic, of her murmur appears to be worsening, possibly due to valvular disease.  Less likely due to the new onset rate controlled atrial fibrillation.  3.  New onset atrial fibrillation, on anticoagulation.  Rate controlled.  4.  Hypertension controlled  5.  Dyslipidemia controlled    Patient's: Discussed option with the patient.  At this time we will perform an echocardiogram to evaluate his LVEF and other heart disease.  We will perform a left heart catheterization at his earliest convenience.  Continue Eliquis for now, and will add furosemide 40 mill grams daily for his heart failure.  Will reassess his symptoms after diuresis and revascularization.  But if his symptoms persist can consider though version at a later time.     Lucas Wallis MD      Dictated utilizing Dragon dictation

## 2019-06-06 PROBLEM — I50.9 ACUTE CONGESTIVE HEART FAILURE (HCC): Status: ACTIVE | Noted: 2019-01-01

## 2019-06-10 NOTE — TELEPHONE ENCOUNTER
Spoke with patient, he reports he does not wish to have LHC tomorrow as he is feeling better and is concerned about the risks.  He wants to know if he needs to continue lasix and apixaban and I advised him yes per Dr. Wallis continue meds for now.  He is to keep appt for echo on June 21, 2019

## 2019-06-27 NOTE — TELEPHONE ENCOUNTER
"Patient did not get his heart cath done on 06/11 because he started to feel better after taking the lasix, and was hesitant to have \"surgery\" done. However, he went to his PCP today and after reviewing the echo results with him the pt has decided he will proceed with heart cath. I explained that a heart cath is not an invasive procedure. New order placed. Pt is aware we will contact to schedule.   "

## 2019-06-27 NOTE — PROGRESS NOTES
Date: 6/27/2019  Provider seeing patient: Antonio Hatch MD  Patient's PCP: Antonio Hatch MD  Patient's Name: Zach Ramsey  .  Referring Surgeon: Bruce    Planned Procedure: Right Eye cataract and the 15 days later will do left eye.     Date of Operation: pending cardiac clearance      HPI/Chief Complaint:    Zach Ramsey is a 79 y.o. male who was referred for a pre-operative evaluation.       HPI   Here for preop of cataract.  Has had decreased vision.  No reported eye pain.  Was here several weeks ago to have this done but was seen to have atrial fibrillation and was referred to cardiology for evaluation and eventual clearance.    Had been here for preop and was found to be in afib. On Eliquis.   Started on Lasix 40 mg everyday. Increased urination  Was to have a cath but he canceled since felt better  Still tired all the time  Cramps in feet and legs, worse since Lasix    No chest pain     No shortness of breath    Has lost 14 pounds    Recent echocardiogram showed a decrease EF of 40%.  Had been 65% 2 years ago.    Other problems include hypertension which is stable.  Blood pressures a little bit low since starting the Lasix 40 mg.  Has had some dizziness and lightheadedness related to this.    Has known coronary artery disease with history of coronary artery bypass graft x6    Preop Risk  Coronary disease  Congestive heart failure    Problem List   Patient Active Problem List   Diagnosis   • Aortic valve insufficiency   • Atherosclerosis of coronary artery   • Gastroesophageal reflux disease with esophagitis   • Hyperlipidemia   • Essential hypertension   • Coronary artery disease   • Dyslipidemia   • Arthritis   • Nephrolithiasis   • Hyperglycemia   • Asthma   • Peripheral arterial disease (CMS/HCC)   • Persistent atrial fibrillation (CMS/HCC)   • Acute congestive heart failure (CMS/HCC)        Current Meds  Current Outpatient Medications   Medication Sig Dispense Refill   • amLODIPine  (NORVASC) 10 MG tablet Take 1 tablet by mouth Daily. 90 tablet 3   • apixaban (ELIQUIS) 5 MG tablet tablet Take 1 tablet by mouth Every 12 (Twelve) Hours. 60 tablet 11   • aspirin 81 MG EC tablet Take  by mouth daily.     • baclofen (LIORESAL) 10 MG tablet Take 1 tablet by mouth 3 (Three) Times a Day. 30 tablet 2   • erythromycin (ROMYCIN) 5 MG/GM ophthalmic ointment      • furosemide (LASIX) 40 MG tablet Take 1 tablet by mouth Daily. 30 tablet 5   • lisinopril-hydrochlorothiazide (PRINZIDE,ZESTORETIC) 20-12.5 MG per tablet TAKE 1 TABLET DAILY 90 tablet 3   • nitroglycerin (NITROSTAT) 0.4 MG SL tablet Place 1 tablet under the tongue Every 5 (Five) Minutes As Needed for Chest Pain. 25 tablet 6   • pantoprazole (PROTONIX) 40 MG EC tablet TAKE 1 TABLET DAILY 90 tablet 3   • pravastatin (PRAVACHOL) 80 MG tablet TAKE 1 TABLET DAILY 90 tablet 2     No current facility-administered medications for this visit.        Allergies: is allergic to isosorbide nitrate and metoprolol.     [facility]  Past Medical History:   Diagnosis Date   • Arthritis    • Coronary artery disease    • Dyslipidemia    • GERD (gastroesophageal reflux disease)    • Hypertension    • Nephrolithiasis    • Tobacco use     Remote tobacco use with cessation in 1977.           Past Surgical History   has a past surgical history that includes Neck surgery; Total knee arthroplasty (Right); Tonsillectomy; Coronary artery bypass graft; and Other surgical history.    Cardiac History: CAD and CHF. No stents. + CABG(6 vessels)  Anesthesia Complications: none    SH:   reports that he quit smoking about 42 years ago. He has never used smokeless tobacco. He reports that he does not drink alcohol or use drugs.    FH:  family history includes Coronary artery disease in his father; Diabetes in his mother.    Review Of Systems:    Review of Systems   Constitutional: Positive for fatigue.   HENT: Negative.    Respiratory: Positive for shortness of breath.    Cardiovascular:  "Positive for chest pain ( Intermittent.  No change).   Musculoskeletal: Negative.    Neurological: Positive for weakness ( Generalized).   Psychiatric/Behavioral: Negative.          Objective      Vitals:    06/27/19 1353 06/27/19 1424   BP: 118/58 98/56   Pulse: 70    Resp: 18    Temp: 98.5 °F (36.9 °C)    Weight: 90.7 kg (200 lb)    Height: 172.7 cm (68\")         Physical Exam   Constitutional: Vital signs are normal. He appears well-developed and well-nourished.   HENT:   Head: Normocephalic and atraumatic.   Right Ear: Hearing, tympanic membrane, external ear and ear canal normal.   Left Ear: Hearing, tympanic membrane, external ear and ear canal normal.   Mouth/Throat: Oropharynx is clear and moist.   Eyes: Conjunctivae, EOM and lids are normal. Pupils are equal, round, and reactive to light.   Neck: Normal range of motion. Neck supple. No thyromegaly present.   Cardiovascular: Normal rate. An irregularly irregular rhythm present. Exam reveals no friction rub.   Murmur heard.   Systolic murmur is present with a grade of 3/6.  Pulmonary/Chest: Effort normal and breath sounds normal. No respiratory distress. He has no wheezes. He has no rales.   Abdominal: Soft. Normal appearance and bowel sounds are normal. He exhibits no distension and no mass. There is no tenderness. There is no rebound and no guarding.   Musculoskeletal: He exhibits no edema.   Neurological: He is alert. He has normal strength.   Skin: Skin is warm and dry.   Psychiatric: He has a normal mood and affect. His speech is normal. Cognition and memory are normal.   Nursing note and vitals reviewed.      EKG: Deferred.  Just had one several weeks ago.          Assessment     Problem List Items Addressed This Visit     None      Visit Diagnoses     Age-related cataract of both eyes, unspecified age-related cataract type    -  Primary    Preop examination        Atrial fibrillation, unspecified type (CMS/HCC)        Relevant Orders    Comprehensive " Metabolic Panel    CBC & Differential    Hypotension due to hypovolemia        Relevant Orders    Comprehensive Metabolic Panel    CBC & Differential           Orders Placed This Encounter   Procedures   • Comprehensive Metabolic Panel   • CBC & Differential     Order Specific Question:   Manual Differential     Answer:   No       Zach Ramsey is at medium risk for a fabiano-operative cardiac event for the planned procedure.  Determination on clearance for this procedure once labs reviewed.  And determination if cardiac catheterization is needed.  Message has been sent to Dr. Wallis.       Antonio Hatch MD

## 2019-06-27 NOTE — TELEPHONE ENCOUNTER
----- Message from Lucas Wallis MD sent at 6/26/2019  4:09 PM EDT -----  Needs left heart cath Xience

## 2019-06-28 NOTE — TELEPHONE ENCOUNTER
Please call patient.  Dr. Wallis got back with me and he does recommend that he proceed with the heart cath.     Patient needs to call their office to get this set up.    See lab result note as well.

## 2019-06-28 NOTE — TELEPHONE ENCOUNTER
----- Message from Lucas Wallis MD sent at 6/27/2019  6:08 PM EDT -----  Thanks, he definitely needs his left heart cath given his decreased LVEF.  If he is well and just have him call the office to reschedule  ----- Message -----  From: Antonio Hatch MD  Sent: 6/27/2019   2:14 PM  To: Lucas Wallis MD    Zach Ramsey was here today for follow up and for preop. He has lost 15 pounds with the furosemide. He is feeling weaker and some lightheadedness. Lower blood pressure with dizziness.     I noticed that his EF decreased to 40% from 65% and some hypokinesis on echo.     I am going to decrease the lasix to 1/2 of 40 mg and check labs ( CBC and CMP)    He had cancelled the cath due to his fear of complications. He is willing to reschedule the cath given the decreased EF but wanted to check with you on that.

## 2019-06-28 NOTE — TELEPHONE ENCOUNTER
Patient informed, verbalized good understanding. States he has already spoke with Dr Gaines office and he is scheduled for July 9th at 9am. Patient informed of recent lab results as well.

## 2019-07-08 PROBLEM — R93.1 ABNORMAL ECHOCARDIOGRAM: Status: ACTIVE | Noted: 2019-01-01

## 2019-07-09 NOTE — H&P
[facility] at [facility]   History and physical      Patient Care Team:  Antonio Hatch MD as PCP - General  Antonio Hatch MD as PCP - Family Medicine  Antonio Hatch MD as PCP - Claims Attributed       PROBLEM LIST:  1. Coronary artery disease.  a. In 2000, coronary artery bypass grafting.   b. In 2010, cardiac catheterization done at [facility] which showed normal left ventricular function. Three vessel native coronary disease. Patent vein graft to the PDA, patent vein graft to the diagonal and OM branches, and a patent LIMA to the LAD.   c. Echocardiogram, June 2014 which showed mild  to moderate aortic regurgitation. Normal LVEF.   d. Cardiac catheterization, 09/04/2015, revealing patent URIOSTEGUI to LAD, patent saphenous vein graft to the D1, OM1 and OM2, patent saphenous vein graft to the PDA and native right coronary artery with multiple  stenoses and heavily calcified areas that supply relative little left ventricular myocardium, medical management.     e. June 2019 new onset heart failure  2. Atrial fibrillation  a.  noted in pre op evaluation with PCP, rate controlled  b. CHADSVASC- 4  c. Started on Eliquis by PCP  d. 6/21/2019 echo EF 40%.  Multiple wall motion abnormalities.  Mild aortic stenosis, mild to moderate mitral valve regurgitation.  3. Hypertension.   4. Dyslipidemia.   5. Gastroesophageal reflux disease.   6. Arthritis.   7. Nephrolithiasis.   8. Mild esophageal stricture s/p dilatation  9. Remote neck surgery.   10. Right total knee replacement.   11. Tonsillectomy.   12. Remote tobacco use with cessation in 1977.       Allergies   Allergen Reactions   • Isosorbide Nitrate Dizziness   • Metoprolol Nausea Only           Current Facility-Administered Medications:   •  aspirin tablet 325 mg, 325 mg, Oral, Once **AND** [START ON 7/10/2019] aspirin EC tablet 325 mg, 325 mg, Oral, Daily, Rama Prieto APRN  •  nitroglycerin (NITROSTAT) SL tablet 0.4 mg, 0.4 mg,  Sublingual, Q5 Min PRN, Rama Prieto, APRN  •  ondansetron (ZOFRAN) injection 4 mg, 4 mg, Intravenous, Q6H PRN, Rama Prieto, APRDEBI  •  sodium chloride 0.9 % flush 1-10 mL, 1-10 mL, Intravenous, PRN, Rama Prieto, APRN  •  sodium chloride 0.9 % flush 3 mL, 3 mL, Intravenous, Q12H, Rama Prieto, CARLOS         Medications Prior to Admission   Medication Sig Dispense Refill Last Dose   • amLODIPine (NORVASC) 10 MG tablet Take 1 tablet by mouth Daily. 90 tablet 3 7/9/2019 at Unknown time   • apixaban (ELIQUIS) 5 MG tablet tablet Take 1 tablet by mouth Every 12 (Twelve) Hours. 60 tablet 11 7/6/2019   • aspirin 81 MG EC tablet Take 81 mg by mouth Daily.   7/9/2019 at Unknown time   • baclofen (LIORESAL) 10 MG tablet Take 1 tablet by mouth 3 (Three) Times a Day. (Patient taking differently: Take 10 mg by mouth 3 (Three) Times a Day As Needed.) 30 tablet 2 Taking   • furosemide (LASIX) 40 MG tablet Take 1 tablet by mouth Daily. (Patient taking differently: Take 20 mg by mouth Daily.) 30 tablet 5 7/9/2019 at Unknown time   • lisinopril-hydrochlorothiazide (PRINZIDE,ZESTORETIC) 20-12.5 MG per tablet TAKE 1 TABLET DAILY 90 tablet 3 7/9/2019 at Unknown time   • nitroglycerin (NITROSTAT) 0.4 MG SL tablet Place 1 tablet under the tongue Every 5 (Five) Minutes As Needed for Chest Pain. 25 tablet 6 Taking   • pantoprazole (PROTONIX) 40 MG EC tablet TAKE 1 TABLET DAILY 90 tablet 3 7/9/2019 at Unknown time   • pravastatin (PRAVACHOL) 80 MG tablet TAKE 1 TABLET DAILY 90 tablet 2 7/9/2019 at Unknown time   • erythromycin (ROMYCIN) 5 MG/GM ophthalmic ointment    Taking         Subjective .   History of present illness:    Patient is a 79-year-old  male who we are seeing today for cardiac catheterization secondary to new onset heart failure as well as decreased LVEF.  He has previous history of coronary artery disease with previous coronary artery bypass grafting.  He was last seen in the office early last  month.  At that time he had new onset heart failure symptoms and had been started on diuretic therapy.  He also had atrial fibrillation and was started on anticoagulant therapy by his primary care physician.  He still continues to have exertional shortness of breath.  His lower extremity edema is improved.  His diuretic therapy was recently reduced secondary to significant weakness and hypotension.  Since being seen in the office he had an echocardiogram performed which showed an ejection fraction of 40%.  Given his heart failure symptoms and decreased LVEF he was subsequently scheduled for cardiac catheterization.      Social History     Socioeconomic History   • Marital status:      Spouse name: Not on file   • Number of children: Not on file   • Years of education: Not on file   • Highest education level: Not on file   Tobacco Use   • Smoking status: Former Smoker     Last attempt to quit:      Years since quittin.5   • Smokeless tobacco: Never Used   Substance and Sexual Activity   • Alcohol use: No   • Drug use: No   • Sexual activity: Defer     Family History   Problem Relation Age of Onset   • Diabetes Mother    • Coronary artery disease Father          Review of Systems:  Review of Systems   Constitution: Positive for malaise/fatigue. Negative for fever and weakness.   HENT: Negative for nosebleeds.    Eyes: Negative for redness and visual disturbance.   Cardiovascular: Negative for orthopnea, palpitations and paroxysmal nocturnal dyspnea.   Respiratory: Positive for shortness of breath. Negative for cough, snoring, sputum production and wheezing.    Hematologic/Lymphatic: Negative for bleeding problem.   Skin: Negative for flushing, itching and rash.   Musculoskeletal: Positive for arthritis. Negative for falls, joint pain and muscle cramps.   Gastrointestinal: Negative for abdominal pain, diarrhea, heartburn, nausea and vomiting.   Genitourinary: Negative for hematuria.   Neurological:  "Positive for excessive daytime sleepiness. Negative for dizziness, headaches and tremors.   Psychiatric/Behavioral: Negative for substance abuse. The patient is not nervous/anxious.           Objective   Vitals:  Blood pressure 119/77, pulse 71, temperature 97.2 °F (36.2 °C), temperature source Temporal, height 172.7 cm (68\"), weight 92.1 kg (203 lb 0.7 oz), SpO2 97 %.       Physical Exam   Constitutional: He is oriented to person, place, and time. He appears well-developed and well-nourished. No distress.   HENT:   Head: Normocephalic and atraumatic.   Eyes: Right eye exhibits no discharge. Left eye exhibits no discharge.   Neck: No JVD present. No tracheal deviation present.   Cardiovascular: Normal rate, regular rhythm, normal heart sounds and intact distal pulses. Exam reveals no friction rub.   No murmur heard.  Pulmonary/Chest: Effort normal and breath sounds normal.   Abdominal: Soft. Bowel sounds are normal. There is no tenderness.   Musculoskeletal: He exhibits no edema or deformity.   Neurological: He is alert and oriented to person, place, and time.   Skin: Skin is warm and dry.            Results Review:  I reviewed the patient's new clinical results.  Results from last 7 days   Lab Units 07/09/19  0904   WBC 10*3/mm3 7.11   HEMOGLOBIN g/dL 11.0*   HEMATOCRIT % 32.6*   PLATELETS 10*3/mm3 202           Invalid input(s): LABALBU, PROT          No results found for: TROPONINI                      Assessment/Plan     1. New onset systolic congestive heart failure.  2. Coronary artery disease with previous coronary artery bypass grafting.  3. Cardiomyopathy with EF of 40%.  4. Persistent atrial fibrillation  5. Dyslipidemia     Plan:    1. We will proceed to cardiac catheterization plus or minus catheter-based intervention today.  Eliquis therapy has been held prior to cardiac catheterization.  Further recommendations to follow cardiac catheterization.      CARLOS Viera    Dictated utilizing Dragon " dictation

## 2019-07-11 NOTE — TELEPHONE ENCOUNTER
Spoke with patient, advised to try 3.125 mg BID of the carvedilol, keep a record of BP/HR and call back in 5-7 days.  Advised if BP drops or any other symptoms to contact office.  Understanding verbalized.

## 2019-07-11 NOTE — TELEPHONE ENCOUNTER
Patient called to report that he picked up carvedilol and took 1st dose at approximately 1 pm yesterday and that within an hour of taking, his BP dropped to 80/49.  He reports his BP that am was 117/59.  He has not taken any more since then and this am his BP was 102/60.  Reports heart rate in 80's.  He would like to know if he should continue this at a lower dose of change altogether.

## 2019-07-12 NOTE — TELEPHONE ENCOUNTER
Patient called and reqeusted new RX be sent in to [facility] per their request with the decreased dosage of carvedilol, (see 7/11/19 note).

## 2019-07-16 NOTE — PROGRESS NOTES
Zach Ramsey was here with wife and had recent MI    BP has been low  Rechecked here and was 78/40  No chest pain   Occ shortness of breath with walking   And some dizziness  Has not been able to eat anything     Seeing Cardiology in [place] now    Recent MI: Friday     Rec ER eval with hypotension and recent MI    He agrees and wife will transport    Antonio Hatch MD     7/16/2019   Discussed with his wife at 5:45 PM and he is being admitted to [facility].    Blood pressure has improved.    Antonio Hatch MD

## 2019-07-16 NOTE — PROGRESS NOTES
Assessment/Plan       Problems Addressed this Visit     None            Follow up: No Follow-up on file.     DISCUSSION  ***      MEDICATIONS PRESCRIBED  Requested Prescriptions      No prescriptions requested or ordered in this encounter            Hao dated on ***  was reviewed and appropriate.        -------------------------------------------    Subjective     No chief complaint on file.        History of Present Illness    ***        Social History     Tobacco Use   Smoking Status Former Smoker   • Last attempt to quit:    • Years since quittin.5   Smokeless Tobacco Never Used        Past Medical History,Medications, Allergies, and social history was reviewed.      Review of Systems    Objective     There were no vitals filed for this visit.       Physical Exam              Antonio Hatch MD

## 2019-07-18 NOTE — TELEPHONE ENCOUNTER
Please call, glad he is improving. Have him follow up a week or so after he gets out and bring new med list.

## 2019-07-18 NOTE — TELEPHONE ENCOUNTER
----- Message from Tyrone Beckham Rep sent at 7/18/2019  1:25 PM EDT -----  Contact: PTS WIFE ISAIAH; JANAY  PATIENTS WIFE ISAIAH IS CALLING LYLE'S BP IS MORE LEVELED OUT.  HE HAD A MUGA SCAN LAST NIGHT AND SHOWED LEFT SIDE OF HIS HEART WAS FUNCTIONING AT 42%.  DR HYATT @ [facility] WAS QUITE PLEASED WITH THAT.  LYLE IS STILL IN THE HOSPITAL THEY HAVE CHANGED A COUPLE OF HIS MEDICATIONS AND KEPT HIM TO MONITOR HIM.    PT WIFE: [phone]

## 2019-07-25 PROBLEM — I50.22 CHRONIC SYSTOLIC CONGESTIVE HEART FAILURE (HCC): Status: ACTIVE | Noted: 2019-01-01

## 2019-07-25 NOTE — PROGRESS NOTES
Assessment/Plan       Problems Addressed this Visit        Cardiovascular and Mediastinum    Coronary artery disease - Primary    Relevant Medications    clopidogrel (PLAVIX) 75 MG tablet    nitroglycerin (NITRODUR) 0.2 MG/HR patch    ranolazine (RANEXA) 500 MG 12 hr tablet    sacubitril-valsartan (ENTRESTO) 24-26 MG tablet    Chronic systolic congestive heart failure (CMS/HCC)    Relevant Medications    clopidogrel (PLAVIX) 75 MG tablet    nitroglycerin (NITRODUR) 0.2 MG/HR patch    ranolazine (RANEXA) 500 MG 12 hr tablet    sacubitril-valsartan (ENTRESTO) 24-26 MG tablet    furosemide (LASIX) 20 MG tablet      Other Visit Diagnoses     Upper respiratory tract infection, unspecified type        Relevant Medications    HYDROcodone-homatropine (HYCODAN) 5-1.5 MG/5ML syrup    Cough        Relevant Medications    HYDROcodone-homatropine (HYCODAN) 5-1.5 MG/5ML syrup            Follow up: Return in about 2 months (around 9/25/2019).     DISCUSSION  Coronary disease.  Recent hospitalization with mild congestive heart failure and hypertension.  Overall improving.  Recommend follow-up with cardiology especially if blood pressure is decreasing a.m.  May also need additional diuretic and I will call cardiology.    Persistent upper respiratory infection with cough.  Completed Z-Benny and the discoloration is improved with stopping persistent cough.  Hycodan was given to use for cough.  Side effects previously explained including sedation.  Call if not improving.          MEDICATIONS PRESCRIBED  Requested Prescriptions     Signed Prescriptions Disp Refills   • HYDROcodone-homatropine (HYCODAN) 5-1.5 MG/5ML syrup 180 mL 0     Sig: Take 5 mL by mouth Every 6 (Six) Hours As Needed for Cough.            Hao dated on 7/25/2019   was reviewed and appropriate.        -------------------------------------------    Subjective     Chief Complaint   Patient presents with   • Follow-up from hosp discharge   • CHF, CAD & A fib   • Cough  "    for the past two weeks         Coronary Artery Disease   Presents for follow-up visit. Symptoms include chest pain, chest pressure, leg swelling, muscle weakness and shortness of breath. The symptoms have been stable. Compliance with diet is good. Compliance with exercise: unable to exercise. Compliance with medications is good.         CHF  Left here last time and admitted for 3-4 days  Hypotension   Adjusted meds to help  Since home + weak and BP down at times  Sees cardiology 2019  Has angina pain for several min and then goes away  Same pain that he has had for> 1 year  Worse with eating and activity    No pain now    Not able to do anything physically  Hard to walk around due to weakness    Cough  Increased all night  Decreased sleep and has to sleep in a chair  No fever  White sputum and had been green  Gave a zpak in hospital   Worse when laying down        Social History     Tobacco Use   Smoking Status Former Smoker   • Last attempt to quit:    • Years since quittin.5   Smokeless Tobacco Never Used        Past Medical History,Medications, Allergies, and social history was reviewed.      Review of Systems   Constitutional: Negative.    HENT: Positive for congestion.    Respiratory: Positive for cough and shortness of breath.    Cardiovascular: Positive for chest pain and leg swelling.   Gastrointestinal: Negative.    Musculoskeletal: Positive for muscle weakness.   Neurological: Negative.    Psychiatric/Behavioral: Negative.        Objective     Vitals:    19 1042   BP: 110/60   Pulse: 68   Resp: 20   Temp: 97.4 °F (36.3 °C)   Weight: 95.3 kg (210 lb)   Height: 172.7 cm (67.99\")          Physical Exam   Constitutional: He is oriented to person, place, and time. He appears well-developed and well-nourished.   HENT:   Head: Normocephalic and atraumatic.   Right Ear: External ear normal.   Left Ear: External ear normal.   Eyes: EOM are normal. Pupils are equal, round, and reactive to " light.   Cardiovascular: Normal rate, regular rhythm and normal heart sounds.   Pulmonary/Chest: Effort normal and breath sounds normal. No respiratory distress. He has no wheezes. He has no rales.   Musculoskeletal: He exhibits edema.   Neurological: He is alert and oriented to person, place, and time.   Skin: Skin is warm and dry.   Psychiatric: He has a normal mood and affect. His behavior is normal.   Nursing note and vitals reviewed.                Antonio Hatch MD

## 2019-08-14 ENCOUNTER — TELEPHONE (OUTPATIENT)
Dept: FAMILY MEDICINE CLINIC | Facility: CLINIC | Age: 79
End: 2019-08-14

## 2019-08-14 NOTE — TELEPHONE ENCOUNTER
----- Message from Emani Laureano, Tyrone Rep sent at 8/14/2019  9:51 AM EDT -----  Contact: PT WIFE; JANAY  SHE IS CALLING TO LET US KNOW HER  PASSED AWAY 8/12/2019    [phone]

## 2019-08-14 NOTE — TELEPHONE ENCOUNTER
Discussed with Sonja, his wife. He had become weaker and sicker and hap episode of fast heart rate and vomiting. Went to [facility] and discharged. Following day was weaker and then collapsed and passed away at home. Expressed my condolences. He  2019.

## 2020-01-14 NOTE — TELEPHONE ENCOUNTER
----- Message from Tyrone Perez Rep sent at 7/22/2019 10:10 AM EDT -----  Contact: JANAY / ISAIAH COLON CALLED AND STATED THAT PT NEEDS A HOSPTIAL FOLLOW UP - DR WALSH [facility] FOLLOW UP IS NOT UNTIL 8/8  - HOW SOON DOES HE NEED TO COME IN.    ISAIAH  - [phone]  
Called informed pt and sent up front to katie to schedule.   
Please call, would like to see this week. Ok to use Thursday or Friday opening or same day on those days.   
Dr. Tyrese Mayer [phone] preop visit 1/13/20 with labs

## (undated) DEVICE — GUIDE CATHETER: Brand: MACH1™

## (undated) DEVICE — MODEL BT2000 P/N 700287-012KIT CONTENTS: MANIFOLD WITH SALINE AND CONTRAST PORTS, SALINE TUBING WITH SPIKE AND HAND SYRINGE, TRANSDUCER: Brand: BT2000 AUTOMATED MANIFOLD KIT

## (undated) DEVICE — RADIFOCUS GLIDEWIRE: Brand: GLIDEWIRE

## (undated) DEVICE — GW J TP FIX CORE .035 150

## (undated) DEVICE — CATH DIAG EXPO M/ PK 6FR FL4/FR4 PIG 3PK

## (undated) DEVICE — CATH DIAG EXPO .056 IM 6F 100CM

## (undated) DEVICE — PK CATH CARD 10

## (undated) DEVICE — CATH DIAG EXPO .045 AR1 5F 100CM

## (undated) DEVICE — MODEL AT P65, P/N 701554-001KIT CONTENTS: HAND CONTROLLER, 3-WAY HIGH-PRESSURE STOPCOCK WITH ROTATING END AND PREMIUM HIGH-PRESSURE TUBING: Brand: ANGIOTOUCH® KIT

## (undated) DEVICE — PINNACLE INTRODUCER SHEATH: Brand: PINNACLE